# Patient Record
Sex: MALE | Race: WHITE
[De-identification: names, ages, dates, MRNs, and addresses within clinical notes are randomized per-mention and may not be internally consistent; named-entity substitution may affect disease eponyms.]

---

## 2017-04-03 ENCOUNTER — HOSPITAL ENCOUNTER (OUTPATIENT)
Dept: HOSPITAL 53 - M LAB | Age: 57
End: 2017-04-03
Attending: FAMILY MEDICINE
Payer: COMMERCIAL

## 2017-04-03 DIAGNOSIS — E13.9: Primary | ICD-10-CM

## 2017-04-03 LAB
ALBUMIN SERPL BCG-MCNC: 3.7 GM/DL (ref 3.2–5.2)
ALBUMIN/GLOB SERPL: 1.09 {RATIO} (ref 1–1.93)
ALP SERPL-CCNC: 90 U/L (ref 45–117)
ALT SERPL W P-5'-P-CCNC: 82 U/L (ref 12–78)
ANION GAP SERPL CALC-SCNC: 9 MEQ/L (ref 8–16)
AST SERPL-CCNC: 68 U/L (ref 15–37)
BILIRUB SERPL-MCNC: 0.9 MG/DL (ref 0.2–1)
BUN SERPL-MCNC: 24 MG/DL (ref 7–18)
CALCIUM SERPL-MCNC: 9.2 MG/DL (ref 8.5–10.1)
CHLORIDE SERPL-SCNC: 102 MEQ/L (ref 98–107)
CO2 SERPL-SCNC: 27 MEQ/L (ref 21–32)
CREAT SERPL-MCNC: 1.13 MG/DL (ref 0.7–1.3)
EST. AVERAGE GLUCOSE BLD GHB EST-MCNC: 169 MG/DL (ref 60–110)
GFR SERPL CREATININE-BSD FRML MDRD: > 60 ML/MIN/{1.73_M2} (ref 56–?)
GLUCOSE SERPL-MCNC: 230 MG/DL (ref 70–105)
POTASSIUM SERPL-SCNC: 4.3 MEQ/L (ref 3.5–5.1)
PROT SERPL-MCNC: 7.1 GM/DL (ref 6.4–8.2)
SODIUM SERPL-SCNC: 138 MEQ/L (ref 136–145)

## 2017-10-10 ENCOUNTER — HOSPITAL ENCOUNTER (OUTPATIENT)
Dept: HOSPITAL 53 - M LAB | Age: 57
End: 2017-10-10
Attending: FAMILY MEDICINE
Payer: COMMERCIAL

## 2017-10-10 DIAGNOSIS — N18.2: Primary | ICD-10-CM

## 2017-10-10 DIAGNOSIS — E78.2: ICD-10-CM

## 2017-10-10 DIAGNOSIS — Z12.5: ICD-10-CM

## 2017-10-10 DIAGNOSIS — E13.9: ICD-10-CM

## 2017-10-10 DIAGNOSIS — Z11.59: ICD-10-CM

## 2017-10-10 LAB
ALBUMIN SERPL BCG-MCNC: 3.5 GM/DL (ref 3.2–5.2)
ALBUMIN/GLOB SERPL: 0.92 {RATIO} (ref 1–1.93)
ALP SERPL-CCNC: 83 U/L (ref 45–117)
ALT SERPL W P-5'-P-CCNC: 54 U/L (ref 12–78)
ANION GAP SERPL CALC-SCNC: 9 MEQ/L (ref 8–16)
AST SERPL-CCNC: 67 U/L (ref 15–37)
BILIRUB SERPL-MCNC: 1.1 MG/DL (ref 0.2–1)
BUN SERPL-MCNC: 22 MG/DL (ref 7–18)
CALCIUM SERPL-MCNC: 9.2 MG/DL (ref 8.5–10.1)
CHLORIDE SERPL-SCNC: 104 MEQ/L (ref 98–107)
CHOLEST SERPL-MCNC: 180 MG/DL (ref ?–200)
CO2 SERPL-SCNC: 26 MEQ/L (ref 21–32)
CREAT SERPL-MCNC: 1.11 MG/DL (ref 0.7–1.3)
ERYTHROCYTE [DISTWIDTH] IN BLOOD BY AUTOMATED COUNT: 13.2 % (ref 11.5–14.5)
EST. AVERAGE GLUCOSE BLD GHB EST-MCNC: 194 MG/DL (ref 60–110)
GFR SERPL CREATININE-BSD FRML MDRD: > 60 ML/MIN/{1.73_M2} (ref 56–?)
GLUCOSE SERPL-MCNC: 182 MG/DL (ref 70–105)
MCH RBC QN AUTO: 30 PG (ref 27–33)
MCHC RBC AUTO-ENTMCNC: 33.5 G/DL (ref 32–36.5)
MCV RBC AUTO: 89.7 FL (ref 80–96)
PLATELET # BLD AUTO: 121 10^3/UL (ref 150–450)
POTASSIUM SERPL-SCNC: 4.1 MEQ/L (ref 3.5–5.1)
PROT SERPL-MCNC: 7.3 GM/DL (ref 6.4–8.2)
SODIUM SERPL-SCNC: 139 MEQ/L (ref 136–145)
TRIGL SERPL-MCNC: 293 MG/DL (ref ?–150)
WBC # BLD AUTO: 6 10^3/UL (ref 4–10)

## 2017-10-10 PROCEDURE — 80061 LIPID PANEL: CPT

## 2017-10-10 PROCEDURE — 86803 HEPATITIS C AB TEST: CPT

## 2017-10-10 PROCEDURE — 82043 UR ALBUMIN QUANTITATIVE: CPT

## 2017-10-10 PROCEDURE — 36415 COLL VENOUS BLD VENIPUNCTURE: CPT

## 2017-10-10 PROCEDURE — 80053 COMPREHEN METABOLIC PANEL: CPT

## 2017-10-10 PROCEDURE — 85027 COMPLETE CBC AUTOMATED: CPT

## 2017-10-10 PROCEDURE — 83036 HEMOGLOBIN GLYCOSYLATED A1C: CPT

## 2018-01-15 ENCOUNTER — HOSPITAL ENCOUNTER (OUTPATIENT)
Dept: HOSPITAL 53 - M LAB | Age: 58
End: 2018-01-15
Attending: FAMILY MEDICINE
Payer: COMMERCIAL

## 2018-01-15 DIAGNOSIS — E13.9: Primary | ICD-10-CM

## 2018-01-15 LAB
ANION GAP: 8 MEQ/L (ref 8–16)
BLOOD UREA NITROGEN: 21 MG/DL (ref 7–18)
CALCIUM LEVEL: 9.2 MG/DL (ref 8.5–10.1)
CARBON DIOXIDE LEVEL: 29 MEQ/L (ref 21–32)
CHLORIDE LEVEL: 103 MEQ/L (ref 98–107)
CREATININE FOR GFR: 1.3 MG/DL (ref 0.7–1.3)
EST. AVERAGE GLUCOSE BLD GHB EST-MCNC: 180 MG/DL (ref 60–110)
GFR SERPL CREATININE-BSD FRML MDRD: > 60 ML/MIN/{1.73_M2} (ref 56–?)
GLUCOSE, FASTING: 189 MG/DL (ref 70–105)
POTASSIUM SERUM: 3.7 MEQ/L (ref 3.5–5.1)
SODIUM LEVEL: 140 MEQ/L (ref 136–145)

## 2018-01-15 PROCEDURE — 83036 HEMOGLOBIN GLYCOSYLATED A1C: CPT

## 2018-05-16 ENCOUNTER — HOSPITAL ENCOUNTER (OUTPATIENT)
Dept: HOSPITAL 53 - M LAB | Age: 58
End: 2018-05-16
Attending: FAMILY MEDICINE
Payer: COMMERCIAL

## 2018-05-16 DIAGNOSIS — E13.9: Primary | ICD-10-CM

## 2018-05-16 LAB
ALBUMIN/GLOBULIN RATIO: 0.88 (ref 1–1.93)
ALBUMIN: 3.8 GM/DL (ref 3.2–5.2)
ALKALINE PHOSPHATASE: 123 U/L (ref 45–117)
ALT SERPL W P-5'-P-CCNC: 61 U/L (ref 12–78)
ANION GAP: 9 MEQ/L (ref 8–16)
AST SERPL-CCNC: 63 U/L (ref 7–37)
BILIRUBIN,TOTAL: 1.1 MG/DL (ref 0.2–1)
BLOOD UREA NITROGEN: 25 MG/DL (ref 7–18)
CALCIUM LEVEL: 9.4 MG/DL (ref 8.5–10.1)
CARBON DIOXIDE LEVEL: 26 MEQ/L (ref 21–32)
CHLORIDE LEVEL: 105 MEQ/L (ref 98–107)
CREATININE FOR GFR: 1.27 MG/DL (ref 0.7–1.3)
EST. AVERAGE GLUCOSE BLD GHB EST-MCNC: 194 MG/DL (ref 60–110)
GFR SERPL CREATININE-BSD FRML MDRD: > 60 ML/MIN/{1.73_M2} (ref 56–?)
GLUCOSE, FASTING: 192 MG/DL (ref 70–100)
POTASSIUM SERUM: 4 MEQ/L (ref 3.5–5.1)
SODIUM LEVEL: 140 MEQ/L (ref 136–145)
TOTAL PROTEIN: 8.1 GM/DL (ref 6.4–8.2)

## 2018-05-16 PROCEDURE — 80053 COMPREHEN METABOLIC PANEL: CPT

## 2018-11-19 ENCOUNTER — HOSPITAL ENCOUNTER (OUTPATIENT)
Dept: HOSPITAL 53 - M LAB | Age: 58
End: 2018-11-19
Attending: FAMILY MEDICINE
Payer: COMMERCIAL

## 2018-11-19 DIAGNOSIS — I25.10: ICD-10-CM

## 2018-11-19 DIAGNOSIS — E78.2: ICD-10-CM

## 2018-11-19 DIAGNOSIS — Z12.5: Primary | ICD-10-CM

## 2018-11-19 DIAGNOSIS — E13.9: ICD-10-CM

## 2018-11-19 LAB
ALBUMIN/GLOBULIN RATIO: 0.88 (ref 1–1.93)
ALBUMIN: 3.6 GM/DL (ref 3.2–5.2)
ALKALINE PHOSPHATASE: 132 U/L (ref 45–117)
ALT SERPL W P-5'-P-CCNC: 55 U/L (ref 12–78)
ANION GAP: 7 MEQ/L (ref 8–16)
AST SERPL-CCNC: 62 U/L (ref 7–37)
BILIRUBIN,TOTAL: 1.1 MG/DL (ref 0.2–1)
BLOOD UREA NITROGEN: 16 MG/DL (ref 7–18)
CALCIUM LEVEL: 9.4 MG/DL (ref 8.5–10.1)
CARBON DIOXIDE LEVEL: 30 MEQ/L (ref 21–32)
CHLORIDE LEVEL: 104 MEQ/L (ref 98–107)
CHOLEST SERPL-MCNC: 205 MG/DL (ref ?–200)
CHOLESTEROL RISK RATIO: 7.32 (ref ?–5)
CREAT UR-MCNC: 83.9 MG/DL
CREATININE FOR GFR: 1.23 MG/DL (ref 0.7–1.3)
EST. AVERAGE GLUCOSE BLD GHB EST-MCNC: 186 MG/DL (ref 60–110)
GFR SERPL CREATININE-BSD FRML MDRD: > 60 ML/MIN/{1.73_M2} (ref 56–?)
GLUCOSE, FASTING: 194 MG/DL (ref 70–100)
HDLC SERPL-MCNC: 28 MG/DL (ref 40–?)
HEMATOCRIT: 47.3 % (ref 42–52)
HEMOGLOBIN: 15.3 G/DL (ref 13.5–17.5)
MEAN CORPUSCULAR HEMOGLOBIN: 28.4 PG (ref 27–33)
MEAN CORPUSCULAR HGB CONC: 32.3 G/DL (ref 32–36.5)
MEAN CORPUSCULAR VOLUME: 87.8 FL (ref 80–96)
MICROALBUMIN UR-MCNC: < 5 MG/L
MICROALBUMIN/CREAT UR: 5.9 MCG/MG (ref 0–30)
NONHDLC SERPL-MCNC: 177 MG/DL
NRBC BLD AUTO-RTO: 0 % (ref 0–0)
PLATELET COUNT, AUTOMATED: 121 10^3/UL (ref 150–450)
POTASSIUM SERUM: 4 MEQ/L (ref 3.5–5.1)
PSA SERPL-MCNC: 0.2 NG/ML (ref ?–4)
RED BLOOD COUNT: 5.39 10^6/UL (ref 4.3–6.1)
RED CELL DISTRIBUTION WIDTH: 14.5 % (ref 11.5–14.5)
SODIUM LEVEL: 141 MEQ/L (ref 136–145)
TOTAL PROTEIN: 7.7 GM/DL (ref 6.4–8.2)
TRIGLYCERIDES LEVEL: 408 MG/DL (ref ?–150)
WHITE BLOOD COUNT: 6.4 10^3/UL (ref 4–10)

## 2018-11-19 PROCEDURE — 80053 COMPREHEN METABOLIC PANEL: CPT

## 2019-06-04 ENCOUNTER — HOSPITAL ENCOUNTER (OUTPATIENT)
Dept: HOSPITAL 53 - M LAB | Age: 59
End: 2019-06-04
Attending: FAMILY MEDICINE
Payer: COMMERCIAL

## 2019-06-04 DIAGNOSIS — E78.2: ICD-10-CM

## 2019-06-04 DIAGNOSIS — E13.9: Primary | ICD-10-CM

## 2019-06-04 LAB
ALBUMIN SERPL BCG-MCNC: 3.6 GM/DL (ref 3.2–5.2)
ALT SERPL W P-5'-P-CCNC: 54 U/L (ref 12–78)
BILIRUB SERPL-MCNC: 2.5 MG/DL (ref 0.2–1)
BUN SERPL-MCNC: 27 MG/DL (ref 7–18)
CALCIUM SERPL-MCNC: 9.1 MG/DL (ref 8.5–10.1)
CHLORIDE SERPL-SCNC: 103 MEQ/L (ref 98–107)
CHOLEST SERPL-MCNC: 187 MG/DL (ref ?–200)
CHOLEST/HDLC SERPL: 5.67 {RATIO} (ref ?–5)
CO2 SERPL-SCNC: 24 MEQ/L (ref 21–32)
CREAT SERPL-MCNC: 1.29 MG/DL (ref 0.7–1.3)
EST. AVERAGE GLUCOSE BLD GHB EST-MCNC: 186 MG/DL (ref 60–110)
GFR SERPL CREATININE-BSD FRML MDRD: > 60 ML/MIN/{1.73_M2} (ref 56–?)
GLUCOSE SERPL-MCNC: 153 MG/DL (ref 70–100)
HDLC SERPL-MCNC: 33 MG/DL (ref 40–?)
LDLC SERPL CALC-MCNC: 103 MG/DL (ref ?–100)
NONHDLC SERPL-MCNC: 154 MG/DL
POTASSIUM SERPL-SCNC: 4.1 MEQ/L (ref 3.5–5.1)
PROT SERPL-MCNC: 8.1 GM/DL (ref 6.4–8.2)
SODIUM SERPL-SCNC: 137 MEQ/L (ref 136–145)
TRIGL SERPL-MCNC: 255 MG/DL (ref ?–150)

## 2019-06-06 ENCOUNTER — HOSPITAL ENCOUNTER (OUTPATIENT)
Dept: HOSPITAL 53 - M SFHCADAM | Age: 59
End: 2019-06-06
Attending: PHYSICIAN ASSISTANT
Payer: COMMERCIAL

## 2019-06-06 DIAGNOSIS — R19.7: Primary | ICD-10-CM

## 2019-06-06 LAB
ALBUMIN SERPL BCG-MCNC: 3.8 GM/DL (ref 3.2–5.2)
ALT SERPL W P-5'-P-CCNC: 65 U/L (ref 12–78)
AMYLASE SERPL-CCNC: 95 U/L (ref 25–115)
BASOPHILS # BLD AUTO: 0 10^3/UL (ref 0–0.2)
BASOPHILS NFR BLD AUTO: 0.4 % (ref 0–1)
BILIRUB SERPL-MCNC: 1.6 MG/DL (ref 0.2–1)
BUN SERPL-MCNC: 25 MG/DL (ref 7–18)
CALCIUM SERPL-MCNC: 9.3 MG/DL (ref 8.5–10.1)
CHLORIDE SERPL-SCNC: 106 MEQ/L (ref 98–107)
CO2 SERPL-SCNC: 25 MEQ/L (ref 21–32)
CREAT SERPL-MCNC: 1.28 MG/DL (ref 0.7–1.3)
EOSINOPHIL # BLD AUTO: 0.2 10^3/UL (ref 0–0.5)
EOSINOPHIL NFR BLD AUTO: 2.4 % (ref 0–3)
GFR SERPL CREATININE-BSD FRML MDRD: > 60 ML/MIN/{1.73_M2} (ref 56–?)
GLUCOSE SERPL-MCNC: 118 MG/DL (ref 70–100)
HCT VFR BLD AUTO: 46.8 % (ref 42–52)
HGB BLD-MCNC: 15.2 G/DL (ref 13.5–17.5)
LIPASE SERPL-CCNC: 562 U/L (ref 73–393)
LYMPHOCYTES # BLD AUTO: 3 10^3/UL (ref 1.5–4.5)
LYMPHOCYTES NFR BLD AUTO: 30.8 % (ref 24–44)
MCH RBC QN AUTO: 27.9 PG (ref 27–33)
MCHC RBC AUTO-ENTMCNC: 32.5 G/DL (ref 32–36.5)
MCV RBC AUTO: 86 FL (ref 80–96)
MONOCYTES # BLD AUTO: 1 10^3/UL (ref 0–0.8)
MONOCYTES NFR BLD AUTO: 10.8 % (ref 0–5)
NEUTROPHILS # BLD AUTO: 5.3 10^3/UL (ref 1.8–7.7)
NEUTROPHILS NFR BLD AUTO: 55.2 % (ref 36–66)
PLATELET # BLD AUTO: 172 10^3/UL (ref 150–450)
POTASSIUM SERPL-SCNC: 3.9 MEQ/L (ref 3.5–5.1)
PROT SERPL-MCNC: 8.1 GM/DL (ref 6.4–8.2)
RBC # BLD AUTO: 5.44 10^6/UL (ref 4.3–6.1)
SODIUM SERPL-SCNC: 140 MEQ/L (ref 136–145)
WBC # BLD AUTO: 9.7 10^3/UL (ref 4–10)

## 2019-06-07 ENCOUNTER — HOSPITAL ENCOUNTER (OUTPATIENT)
Dept: HOSPITAL 53 - M RAD | Age: 59
End: 2019-06-07
Attending: PHYSICIAN ASSISTANT
Payer: COMMERCIAL

## 2019-06-07 DIAGNOSIS — R74.8: Primary | ICD-10-CM

## 2019-06-07 DIAGNOSIS — R19.7: Primary | ICD-10-CM

## 2019-06-07 PROCEDURE — 74177 CT ABD & PELVIS W/CONTRAST: CPT

## 2019-06-07 NOTE — REP
RIGHT UPPER QUADRANT ULTRASOUND:

 

Real-time sonographic evaluation of the right upper quadrant performed.  Patient

has had a prior cholecystectomy.  There is no intrahepatic or extrahepatic

biliary dilatation, common bile duct measuring 6 mm.  Liver is heterogeneous in

echotexture and mildly enlarged at 17.4 cm in length.  No liver mass is seen.

Main portal vein is mildly dilated measuring 16 mm which indicate portal

hypertension.  Pancreas could not be visualized.  Right kidney demonstrates no

hydronephrosis with normal size 11.4 cm in length.  No free fluid is seen.

 

IMPRESSION:

 

Status post cholecystectomy.  No biliary dilatation.  No free fluid.  Mild

hepatomegaly with heterogeneous echotexture.  Mildly dilated main portal vein may

indicate portal hypertension.

 

 

Electronically Signed by

Ronald Adan MD 06/10/2019 05:01 P

## 2019-06-07 NOTE — REP
CT ABDOMEN AND PELVIS WITH ORAL AND IV CONTRAST:

 

TECHNIQUE:  Axial contrast enhanced images from the lung bases to the pubic

symphysis using 100 mL Isovue 370 intravenous contrast material with multiplanar

reformations.

 

No infiltrate is seen in the visualized lung bases. No liver mass is seen. The

patient has had a prior cholecystectomy. There is no biliary dilatation. Spleen

is mildly enlarged with a length of 14 cm. No intrinsic splenic abnormality is

seen. Adrenals and pancreas are unremarkable. There is no CT evidence of

pancreatitis. Kidneys demonstrate no hydronephrosis or mass. There is

atherosclerotic calcification of the abdominal aorta without aneurysm. There is

adenopathy. There is no free air or free fluid. There is no bowel wall

thickening. The appendix is normal. No pelvic mass is seen. The urinary bladder

appears unremarkable.

 

There are degenerative changes of the spine.

 

There is a total right hip prosthesis noted.

 

IMPRESSION:

 

No acute abnormality is detected. Status-post cholecystectomy without biliary

dilatation. No free air or free fluid. Mild splenomegaly. Normal appendix. No CT

evidence of pancreatitis.

 

 

Electronically Signed by

Ronald Adan MD 06/11/2019 09:47 A

## 2019-06-09 ENCOUNTER — HOSPITAL ENCOUNTER (OUTPATIENT)
Dept: HOSPITAL 53 - M LAB | Age: 59
End: 2019-06-09
Attending: PHYSICIAN ASSISTANT
Payer: COMMERCIAL

## 2019-06-09 DIAGNOSIS — R19.7: Primary | ICD-10-CM

## 2019-09-03 ENCOUNTER — HOSPITAL ENCOUNTER (OUTPATIENT)
Dept: HOSPITAL 53 - M LAB | Age: 59
End: 2019-09-03
Attending: PHYSICIAN ASSISTANT
Payer: COMMERCIAL

## 2019-09-03 DIAGNOSIS — E11.9: Primary | ICD-10-CM

## 2019-09-03 DIAGNOSIS — A07.1: ICD-10-CM

## 2019-09-03 DIAGNOSIS — K76.9: ICD-10-CM

## 2019-09-03 LAB
ALBUMIN SERPL BCG-MCNC: 3.6 GM/DL (ref 3.2–5.2)
ALT SERPL W P-5'-P-CCNC: 50 U/L (ref 12–78)
BILIRUB SERPL-MCNC: 1.5 MG/DL (ref 0.2–1)
BUN SERPL-MCNC: 22 MG/DL (ref 7–18)
CALCIUM SERPL-MCNC: 9 MG/DL (ref 8.5–10.1)
CHLORIDE SERPL-SCNC: 106 MEQ/L (ref 98–107)
CO2 SERPL-SCNC: 27 MEQ/L (ref 21–32)
CREAT SERPL-MCNC: 1.14 MG/DL (ref 0.7–1.3)
EST. AVERAGE GLUCOSE BLD GHB EST-MCNC: 146 MG/DL (ref 60–110)
GFR SERPL CREATININE-BSD FRML MDRD: > 60 ML/MIN/{1.73_M2} (ref 56–?)
GLUCOSE SERPL-MCNC: 94 MG/DL (ref 70–100)
HCT VFR BLD AUTO: 42.8 % (ref 42–52)
HGB BLD-MCNC: 14.4 G/DL (ref 13.5–17.5)
MCH RBC QN AUTO: 29.4 PG (ref 27–33)
MCHC RBC AUTO-ENTMCNC: 33.6 G/DL (ref 32–36.5)
MCV RBC AUTO: 87.5 FL (ref 80–96)
PLATELET # BLD AUTO: 113 10^3/UL (ref 150–450)
POTASSIUM SERPL-SCNC: 3.8 MEQ/L (ref 3.5–5.1)
PROT SERPL-MCNC: 7.6 GM/DL (ref 6.4–8.2)
RBC # BLD AUTO: 4.89 10^6/UL (ref 4.3–6.1)
SODIUM SERPL-SCNC: 139 MEQ/L (ref 136–145)
WBC # BLD AUTO: 5.7 10^3/UL (ref 4–10)

## 2020-03-05 ENCOUNTER — HOSPITAL ENCOUNTER (OUTPATIENT)
Dept: HOSPITAL 53 - M LAB | Age: 60
End: 2020-03-05
Attending: FAMILY MEDICINE
Payer: COMMERCIAL

## 2020-03-05 DIAGNOSIS — I25.10: Primary | ICD-10-CM

## 2020-03-05 DIAGNOSIS — K76.9: ICD-10-CM

## 2020-03-05 DIAGNOSIS — E78.2: ICD-10-CM

## 2020-03-05 DIAGNOSIS — Z12.5: ICD-10-CM

## 2020-03-05 DIAGNOSIS — E13.9: ICD-10-CM

## 2020-03-05 DIAGNOSIS — I11.9: ICD-10-CM

## 2020-03-05 LAB
ALBUMIN SERPL BCG-MCNC: 3.6 GM/DL (ref 3.2–5.2)
ALT SERPL W P-5'-P-CCNC: 54 U/L (ref 12–78)
BILIRUB SERPL-MCNC: 1.2 MG/DL (ref 0.2–1)
BUN SERPL-MCNC: 19 MG/DL (ref 7–18)
CALCIUM SERPL-MCNC: 9.3 MG/DL (ref 8.8–10.2)
CHLORIDE SERPL-SCNC: 108 MEQ/L (ref 98–107)
CHOLEST SERPL-MCNC: 189 MG/DL (ref ?–200)
CHOLEST/HDLC SERPL: 6.75 {RATIO} (ref ?–5)
CO2 SERPL-SCNC: 25 MEQ/L (ref 21–32)
CREAT SERPL-MCNC: 1.14 MG/DL (ref 0.7–1.3)
CREAT UR-MCNC: 75.6 MG/DL
EST. AVERAGE GLUCOSE BLD GHB EST-MCNC: 148 MG/DL (ref 60–110)
GFR SERPL CREATININE-BSD FRML MDRD: > 60 ML/MIN/{1.73_M2} (ref 49–?)
GLUCOSE SERPL-MCNC: 122 MG/DL (ref 70–100)
HCT VFR BLD AUTO: 44.5 % (ref 42–52)
HDLC SERPL-MCNC: 28 MG/DL (ref 40–?)
HGB BLD-MCNC: 14.8 G/DL (ref 13.5–17.5)
LDLC SERPL CALC-MCNC: 83 MG/DL (ref ?–100)
MCH RBC QN AUTO: 29.3 PG (ref 27–33)
MCHC RBC AUTO-ENTMCNC: 33.3 G/DL (ref 32–36.5)
MCV RBC AUTO: 88.1 FL (ref 80–96)
MICROALBUMIN UR-MCNC: < 5 MG/L
MICROALBUMIN/CREAT UR: 6.6 MCG/MG (ref 0–30)
NONHDLC SERPL-MCNC: 161 MG/DL
PLATELET # BLD AUTO: 99 10^3/UL (ref 150–450)
POTASSIUM SERPL-SCNC: 4 MEQ/L (ref 3.5–5.1)
PROT SERPL-MCNC: 7.6 GM/DL (ref 6.4–8.2)
RBC # BLD AUTO: 5.05 10^6/UL (ref 4.3–6.1)
SODIUM SERPL-SCNC: 141 MEQ/L (ref 136–145)
TRIGL SERPL-MCNC: 391 MG/DL (ref ?–150)
WBC # BLD AUTO: 5.5 10^3/UL (ref 4–10)

## 2020-03-05 PROCEDURE — 83036 HEMOGLOBIN GLYCOSYLATED A1C: CPT

## 2020-03-05 PROCEDURE — 36415 COLL VENOUS BLD VENIPUNCTURE: CPT

## 2020-03-05 PROCEDURE — 80061 LIPID PANEL: CPT

## 2020-03-05 PROCEDURE — 85049 AUTOMATED PLATELET COUNT: CPT

## 2020-03-05 PROCEDURE — 85027 COMPLETE CBC AUTOMATED: CPT

## 2020-03-05 PROCEDURE — 82043 UR ALBUMIN QUANTITATIVE: CPT

## 2020-03-05 PROCEDURE — 85055 RETICULATED PLATELET ASSAY: CPT

## 2020-03-05 PROCEDURE — 80053 COMPREHEN METABOLIC PANEL: CPT

## 2020-10-13 ENCOUNTER — HOSPITAL ENCOUNTER (OUTPATIENT)
Dept: HOSPITAL 53 - M SFHCADAM | Age: 60
End: 2020-10-13
Attending: FAMILY MEDICINE
Payer: COMMERCIAL

## 2020-10-13 DIAGNOSIS — E13.9: Primary | ICD-10-CM

## 2020-10-13 LAB
BUN SERPL-MCNC: 20 MG/DL (ref 7–18)
CALCIUM SERPL-MCNC: 9.3 MG/DL (ref 8.8–10.2)
CHLORIDE SERPL-SCNC: 106 MEQ/L (ref 98–107)
CO2 SERPL-SCNC: 28 MEQ/L (ref 21–32)
CREAT SERPL-MCNC: 1.48 MG/DL (ref 0.7–1.3)
EST. AVERAGE GLUCOSE BLD GHB EST-MCNC: 151 MG/DL (ref 60–110)
GFR SERPL CREATININE-BSD FRML MDRD: 51.6 ML/MIN/{1.73_M2} (ref 49–?)
GLUCOSE SERPL-MCNC: 134 MG/DL (ref 70–100)
POTASSIUM SERPL-SCNC: 4 MEQ/L (ref 3.5–5.1)
SODIUM SERPL-SCNC: 141 MEQ/L (ref 136–145)

## 2020-12-15 ENCOUNTER — HOSPITAL ENCOUNTER (OUTPATIENT)
Dept: HOSPITAL 53 - M SFHCPLAZ | Age: 60
End: 2020-12-15
Attending: PHYSICIAN ASSISTANT
Payer: COMMERCIAL

## 2020-12-15 DIAGNOSIS — R10.33: Primary | ICD-10-CM

## 2020-12-15 LAB
ALBUMIN SERPL BCG-MCNC: 3.6 GM/DL (ref 3.2–5.2)
ALT SERPL W P-5'-P-CCNC: 66 U/L (ref 12–78)
BASOPHILS # BLD AUTO: 0.1 10^3/UL (ref 0–0.2)
BASOPHILS NFR BLD AUTO: 0.8 % (ref 0–1)
BILIRUB SERPL-MCNC: 2.3 MG/DL (ref 0.2–1)
BUN SERPL-MCNC: 26 MG/DL (ref 7–18)
CALCIUM SERPL-MCNC: 10.2 MG/DL (ref 8.8–10.2)
CHLORIDE SERPL-SCNC: 103 MEQ/L (ref 98–107)
CO2 SERPL-SCNC: 28 MEQ/L (ref 21–32)
CREAT SERPL-MCNC: 1.41 MG/DL (ref 0.7–1.3)
CRP SERPL-MCNC: 0.53 MG/DL (ref 0–0.3)
EOSINOPHIL # BLD AUTO: 0.2 10^3/UL (ref 0–0.5)
EOSINOPHIL NFR BLD AUTO: 3.1 % (ref 0–3)
ERYTHROCYTE [SEDIMENTATION RATE] IN BLOOD BY WESTERGREN METHOD: 32 MM/HR (ref 0–20)
GFR SERPL CREATININE-BSD FRML MDRD: 54.6 ML/MIN/{1.73_M2} (ref 49–?)
GLUCOSE SERPL-MCNC: 130 MG/DL (ref 70–100)
HCT VFR BLD AUTO: 45.8 % (ref 42–52)
HGB BLD-MCNC: 15.6 G/DL (ref 13.5–17.5)
LIPASE SERPL-CCNC: 922 U/L (ref 73–393)
LYMPHOCYTES # BLD AUTO: 2.4 10^3/UL (ref 1.5–5)
LYMPHOCYTES NFR BLD AUTO: 32.7 % (ref 24–44)
MCH RBC QN AUTO: 30.7 PG (ref 27–33)
MCHC RBC AUTO-ENTMCNC: 34.1 G/DL (ref 32–36.5)
MCV RBC AUTO: 90.2 FL (ref 80–96)
MONOCYTES # BLD AUTO: 0.6 10^3/UL (ref 0–0.8)
MONOCYTES NFR BLD AUTO: 8.4 % (ref 0–5)
NEUTROPHILS # BLD AUTO: 4.1 10^3/UL (ref 1.5–8.5)
NEUTROPHILS NFR BLD AUTO: 54.7 % (ref 36–66)
PLATELET # BLD AUTO: 119 10^3/UL (ref 150–450)
POTASSIUM SERPL-SCNC: 3.8 MEQ/L (ref 3.5–5.1)
PROT SERPL-MCNC: 8.5 GM/DL (ref 6.4–8.2)
RBC # BLD AUTO: 5.08 10^6/UL (ref 4.3–6.1)
SODIUM SERPL-SCNC: 138 MEQ/L (ref 136–145)
WBC # BLD AUTO: 7.5 10^3/UL (ref 4–10)

## 2020-12-16 ENCOUNTER — HOSPITAL ENCOUNTER (OUTPATIENT)
Dept: HOSPITAL 53 - M RAD | Age: 60
End: 2020-12-16
Attending: PHYSICIAN ASSISTANT
Payer: COMMERCIAL

## 2020-12-16 DIAGNOSIS — Z90.49: ICD-10-CM

## 2020-12-16 DIAGNOSIS — R10.33: ICD-10-CM

## 2020-12-16 DIAGNOSIS — K57.30: Primary | ICD-10-CM

## 2020-12-16 PROCEDURE — 74177 CT ABD & PELVIS W/CONTRAST: CPT

## 2020-12-16 NOTE — REP
INDICATION:

PERIUMBILICAL ABD PAIN.



COMPARISON:

Comparison CT study June 7, 2019..



TECHNIQUE:

100 mL of intravenous Isovue 370 is administered.  Helical scanning is acquired.  3 mm

axial images are re-formatted.  Coronal and sagittal MPR images are generated.  Oral

contrast was also administered.



FINDINGS:

Digital preliminary  radiograph shows a right hip arthroplasty and surgical clips

in the right upper quadrant of the abdomen.  Bowel gas pattern is normal.  On axial CT

images, the lung bases are clear.  There is heavy vascular calcification and/or stent

material along the course of the coronary arteries bilaterally.  No pleural effusion

or upper abdominal ascites is seen.



There is mild fat streaking in the region of the descending duodenum and pancreatic

head which appears to be a new finding.  Question pancreatitis.



There are venous collaterals around the gastroesophageal junction and distal

esophagus.  The spleen is mildly enlarged measuring 13.9 cm in greatest diameter.  No

focal splenic lesion is seen.  No abnormality is noted in the pancreas.  The

gallbladder is surgically absent.  The liver is not felt to be enlarged.  Normal

adrenal glands are seen.  There are some venous collaterals a in the celiac axis

region.  Portal vein is not felt to be dilated.  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

malrotated but otherwise intact.  Kidneys enhance symmetrically.  No retroperitoneal

mass or adenopathy is seen.  The patient reports prior appendectomy however a normal

appendix is visible at the cecal tip in the right lower quadrant.  There is mild cecal

diverticulosis without CT evidence of diverticulitis.  There is also mild

diverticulosis of the sigmoid colon again without evidence of diverticulitis.  Urinary

bladder is unremarkable.  There is some spray artifact at on pelvic images from the

right hip arthroplasty.  Vascular calcification is noted.  There is a stable

oval-shaped fat lobule in the suprapubic region anterior abdomen 2.9 cm in diameter

unchanged.  This does not appear inflamed and has not enlarged.  No bony destructive

lesion is seen.  No evidence of free air or free fluid.



IMPRESSION:

Post cholecystectomy and right hip arthroplasty.  Mild right and left colonic

diverticulosis without CT evidence of diverticulitis.  There is some fat streaking

adjacent to the pancreatic head and the descending duodenum.  Question pancreatitis.

Mildly enlarged spleen.  Venous collaterals in the upper abdomen question mild portal

venous hypertension.





<Electronically signed by Omar Garner > 12/16/20 [id number]

## 2020-12-18 ENCOUNTER — HOSPITAL ENCOUNTER (OUTPATIENT)
Dept: HOSPITAL 53 - M SFHCPLAZ | Age: 60
End: 2020-12-18
Attending: PHYSICIAN ASSISTANT
Payer: COMMERCIAL

## 2020-12-18 DIAGNOSIS — K76.0: Primary | ICD-10-CM

## 2020-12-18 LAB
ALBUMIN SERPL BCG-MCNC: 3.6 GM/DL (ref 3.2–5.2)
ALT SERPL W P-5'-P-CCNC: 86 U/L (ref 12–78)
BASOPHILS # BLD AUTO: 0.1 10^3/UL (ref 0–0.2)
BASOPHILS NFR BLD AUTO: 1 % (ref 0–1)
BILIRUB CONJ SERPL-MCNC: 0.6 MG/DL (ref 0–0.2)
BILIRUB SERPL-MCNC: 2 MG/DL (ref 0.2–1)
BUN SERPL-MCNC: 21 MG/DL (ref 7–18)
CALCIUM SERPL-MCNC: 9.9 MG/DL (ref 8.8–10.2)
CHLORIDE SERPL-SCNC: 103 MEQ/L (ref 98–107)
CO2 SERPL-SCNC: 30 MEQ/L (ref 21–32)
CREAT SERPL-MCNC: 1.42 MG/DL (ref 0.7–1.3)
EOSINOPHIL # BLD AUTO: 0.3 10^3/UL (ref 0–0.5)
EOSINOPHIL NFR BLD AUTO: 3.4 % (ref 0–3)
FERRITIN SERPL-MCNC: 83 NG/ML (ref 26–388)
GFR SERPL CREATININE-BSD FRML MDRD: 54.1 ML/MIN/{1.73_M2} (ref 49–?)
GLUCOSE SERPL-MCNC: 141 MG/DL (ref 70–100)
HCT VFR BLD AUTO: 46.3 % (ref 42–52)
HGB BLD-MCNC: 15.8 G/DL (ref 13.5–17.5)
IRON SATN MFR SERPL: 22.8 % (ref 19.7–50)
IRON SERPL-MCNC: 87 UG/DL (ref 65–175)
LIPASE SERPL-CCNC: 473 U/L (ref 73–393)
LYMPHOCYTES # BLD AUTO: 2.6 10^3/UL (ref 1.5–5)
LYMPHOCYTES NFR BLD AUTO: 35.4 % (ref 24–44)
MCH RBC QN AUTO: 30.7 PG (ref 27–33)
MCHC RBC AUTO-ENTMCNC: 34.1 G/DL (ref 32–36.5)
MCV RBC AUTO: 89.9 FL (ref 80–96)
MONOCYTES # BLD AUTO: 0.8 10^3/UL (ref 0–0.8)
MONOCYTES NFR BLD AUTO: 10.2 % (ref 0–5)
NEUTROPHILS # BLD AUTO: 3.6 10^3/UL (ref 1.5–8.5)
NEUTROPHILS NFR BLD AUTO: 49.7 % (ref 36–66)
PHOSPHATE SERPL-MCNC: 3.7 MG/DL (ref 2.5–4.9)
PLATELET # BLD AUTO: 116 10^3/UL (ref 150–450)
POTASSIUM SERPL-SCNC: 3.7 MEQ/L (ref 3.5–5.1)
PROT SERPL-MCNC: 8.4 GM/DL (ref 6.4–8.2)
RBC # BLD AUTO: 5.15 10^6/UL (ref 4.3–6.1)
SODIUM SERPL-SCNC: 139 MEQ/L (ref 136–145)
TIBC SERPL-MCNC: 382 UG/DL (ref 250–450)
WBC # BLD AUTO: 7.3 10^3/UL (ref 4–10)

## 2020-12-22 ENCOUNTER — HOSPITAL ENCOUNTER (OUTPATIENT)
Dept: HOSPITAL 53 - M SFHCPLAZ | Age: 60
End: 2020-12-22
Attending: PHYSICIAN ASSISTANT
Payer: COMMERCIAL

## 2020-12-22 DIAGNOSIS — K76.0: Primary | ICD-10-CM

## 2020-12-22 LAB — H PYLORI IGG SER IA-ACNC: 1.14 (ref 0–0.79)

## 2021-02-25 ENCOUNTER — HOSPITAL ENCOUNTER (OUTPATIENT)
Dept: HOSPITAL 53 - M LABDRWAD | Age: 61
End: 2021-02-25
Attending: INTERNAL MEDICINE
Payer: COMMERCIAL

## 2021-02-25 DIAGNOSIS — R94.5: ICD-10-CM

## 2021-02-25 DIAGNOSIS — E80.4: ICD-10-CM

## 2021-02-25 DIAGNOSIS — K74.60: Primary | ICD-10-CM

## 2021-02-25 LAB
ALBUMIN SERPL BCG-MCNC: 3.3 GM/DL (ref 3.2–5.2)
ALT SERPL W P-5'-P-CCNC: 62 U/L (ref 12–78)
BILIRUB CONJ SERPL-MCNC: 0.4 MG/DL (ref 0–0.2)
BILIRUB SERPL-MCNC: 1.5 MG/DL (ref 0.2–1)
FERRITIN SERPL-MCNC: 49 NG/ML (ref 26–388)
IRON SATN MFR SERPL: 26.6 % (ref 19.7–50)
IRON SERPL-MCNC: 84 UG/DL (ref 65–175)
PROT SERPL-MCNC: 7.2 GM/DL (ref 6.4–8.2)
TIBC SERPL-MCNC: 316 UG/DL (ref 250–450)
TSH SERPL DL<=0.005 MIU/L-ACNC: 1.82 UIU/ML (ref 0.36–3.74)

## 2021-03-01 LAB
ALBUMIN MFR UR ELPH: 52.3 % (ref 55.8–66.1)
ALBUMIN SERPL-MCNC: 3.77 GM/DL (ref 3.29–5.55)
ALPHA1 GLOB MFR SERPL ELPH: 4.3 % (ref 2.9–4.9)
ALPHA1 GLOB SERPL ELPH-MCNC: 0.31 GM/DL (ref 0.17–0.41)
ALPHA2 GLOB SERPL ELPH-MCNC: 0.81 GM/DL (ref 0.42–0.99)
ALPHA2 GLOB SERPL ELPH-MCNC: 11.2 % (ref 7.1–11.8)
B-GLOBULIN SERPL ELPH-MCNC: 0.42 GM/DL (ref 0.28–0.6)
BETA1 GLOB MFR SERPL ELPH: 5.9 % (ref 4.7–7.2)
BETA2 GLOB MFR SERPL ELPH: 5.6 % (ref 3.2–6.5)
BETA2 GLOB SERPL ELPH-MCNC: 0.4 GM/DL (ref 0.19–0.55)
GAMMA GLOB 24H MFR UR ELPH: 20.7 % (ref 11.1–18.8)
GAMMA GLOB SERPL ELPH-MCNC: 1.49 GM/DL (ref 0.65–1.58)
PROT PATTERN SERPL ELPH-IMP: (no result)

## 2021-03-02 ENCOUNTER — HOSPITAL ENCOUNTER (OUTPATIENT)
Dept: HOSPITAL 53 - M LABDRWAD | Age: 61
End: 2021-03-02
Attending: INTERNAL MEDICINE
Payer: COMMERCIAL

## 2021-03-02 DIAGNOSIS — R94.5: ICD-10-CM

## 2021-03-02 DIAGNOSIS — E80.4: ICD-10-CM

## 2021-03-02 DIAGNOSIS — K74.60: Primary | ICD-10-CM

## 2021-03-02 LAB
APTT BLD: 39.3 SECONDS (ref 24.2–38.5)
HBV SURFACE AB SER QL: POSITIVE
HBV SURFACE AB SER-ACNC: NEGATIVE M[IU]/ML
HCV AB SER QL: < 0 INDEX (ref ?–0.8)
HEPATITIS A ANTIBODY IGM: NEGATIVE
INR PPP: 1.11
PROTHROMBIN TIME: 14.5 SECONDS (ref 12.5–14.3)

## 2021-04-03 ENCOUNTER — HOSPITAL ENCOUNTER (OUTPATIENT)
Dept: HOSPITAL 53 - M LABSMTC | Age: 61
End: 2021-04-03
Attending: ANESTHESIOLOGY
Payer: COMMERCIAL

## 2021-04-03 DIAGNOSIS — Z01.812: Primary | ICD-10-CM

## 2021-04-03 DIAGNOSIS — Z20.822: ICD-10-CM

## 2021-04-08 ENCOUNTER — HOSPITAL ENCOUNTER (OUTPATIENT)
Dept: HOSPITAL 53 - M OPP | Age: 61
Discharge: HOME | End: 2021-04-08
Attending: SURGERY
Payer: COMMERCIAL

## 2021-04-08 VITALS — DIASTOLIC BLOOD PRESSURE: 59 MMHG | SYSTOLIC BLOOD PRESSURE: 118 MMHG

## 2021-04-08 VITALS — WEIGHT: 236 LBS | HEIGHT: 71 IN | BODY MASS INDEX: 33.04 KG/M2

## 2021-04-08 DIAGNOSIS — R10.13: ICD-10-CM

## 2021-04-08 DIAGNOSIS — Z86.010: ICD-10-CM

## 2021-04-08 DIAGNOSIS — K31.89: ICD-10-CM

## 2021-04-08 DIAGNOSIS — Z12.11: Primary | ICD-10-CM

## 2021-04-08 DIAGNOSIS — K63.5: ICD-10-CM

## 2021-04-08 DIAGNOSIS — Z79.84: ICD-10-CM

## 2021-04-08 DIAGNOSIS — Z88.8: ICD-10-CM

## 2021-04-08 DIAGNOSIS — Z79.82: ICD-10-CM

## 2021-04-08 DIAGNOSIS — Z79.899: ICD-10-CM

## 2021-04-08 NOTE — ROOR
________________________________________________________________________________

Patient Name: Hima Yao            Procedure Date: 4/8/2021 1:52 PM

MRN: [id number]                          Account Number: [id number]

YOB: 1960               Age: 61

Room: [facility]                            Gender: Male

Note Status: Finalized                 

________________________________________________________________________________

 

Procedure:            Colonoscopy

Indications:          High risk colon cancer surveillance: Personal history of 

                      colonic polyps

Providers:            Leo J. Gosselin Jr, MD

Referring MD:         Adriel Harris MD

Requesting Provider:  

Medicines:            Propofol per Anesthesia

Complications:        No immediate complications.

________________________________________________________________________________

Procedure:            Pre-Anesthesia Assessment:

                      - Prior to the procedure, a History and Physical was 

                      performed, and patient medications and allergies were 

                      reviewed. The patient is competent. The risks and 

                      benefits of the procedure and the sedation options and 

                      risks were discussed with the patient. All questions 

                      were answered and informed consent was obtained. Patient 

                      identification and proposed procedure were verified by 

                      the physician and the nurse in the pre-procedure area 

                      and in the procedure room. Mental Status Examination: 

                      alert and oriented. Airway Examination: normal 

                      oropharyngeal airway and neck mobility. Respiratory 

                      Examination: clear to auscultation. CV Examination: 

                      normal. ASA Grade Assessment: II - A patient with mild 

                      systemic disease. After reviewing the risks and 

                      benefits, the patient was deemed in satisfactory 

                      condition to undergo the procedure. The anesthesia plan 

                      was to use moderate sedation / analgesia (conscious 

                      sedation). Immediately prior to administration of 

                      medications, the patient was re-assessed for adequacy to 

                      receive sedatives. The heart rate, respiratory rate, 

                      oxygen saturations, blood pressure, adequacy of 

                      pulmonary ventilation, and response to care were 

                      monitored throughout the procedure. The physical status 

                      of the patient was re-assessed after the procedure.

                      The Colonoscope was introduced through the anus and 

                      advanced to the cecum, identified by appendiceal orifice 

                      and ileocecal valve. The colonoscopy was performed 

                      without difficulty. The patient tolerated the procedure 

                      well. The quality of the bowel preparation was fair.

                                                                                

Findings:

     The rectum, recto-sigmoid colon, sigmoid colon, cecum, appendiceal 

     orifice and ileocecal valve appeared normal.

     Five polyps were found in the descending colon, transverse colon and 

     ascending colon. The polyps were small in size. These polyps were removed 

     with a hot snare. Resection was complete, but the polyp tissue was only 

     partially retrieved.

                                                                                

Impression:           - Preparation of the colon was fair.

                      - The rectum, recto-sigmoid colon, sigmoid colon, cecum, 

                      appendiceal orifice and ileocecal valve are normal.

                      - Five small polyps in the descending colon, in the 

                      transverse colon and in the ascending colon, removed 

                      with a hot snare. Complete resection. Partial retrieval.

Recommendation:       - Discharge patient to home (ambulatory).

                      - Repeat colonoscopy in 5 years for surveillance.

                                                                                

Procedure Code(s):    --- Professional ---

                      18227, Colonoscopy, flexible; with removal of tumor(s), 

                      polyp(s), or other lesion(s) by snare technique

Diagnosis Code(s):    --- Professional ---

                      Z86.010, Personal history of colonic polyps

                      K63.5, Polyp of colon

 

CPT copyright 2019 [employer]. All rights reserved.

 

The codes documented in this report are preliminary and upon  review may 

be revised to meet current compliance requirements.

 

Leo Gosselin MD

_____________________

Leo J Gosselin Jr, MD

4/8/2021 2:11:21 PM

Electronically signed by Leo Gosselin Jr , MD

Number of Addenda: 0

 

Note Initiated On: 4/8/2021 1:52 PM

Estimated Blood Loss: Estimated blood loss: none.

## 2021-04-08 NOTE — ROOR
________________________________________________________________________________

Patient Name: Hima Yao            Procedure Date: 4/8/2021 1:38 PM

MRN: [id number]                          Account Number: [id number]

YOB: 1960               Age: 61

Room: [facility]                            Gender: Male

Note Status: Finalized                 

________________________________________________________________________________

 

Procedure:            Upper GI endoscopy

Indications:          Epigastric abdominal pain

Providers:            Leo J. Gosselin Jr, MD

Referring MD:         Adriel Harris MD

Requesting Provider:  

Medicines:            Propofol per Anesthesia

Complications:        No immediate complications.

________________________________________________________________________________

Procedure:            Pre-Anesthesia Assessment:

                      - Prior to the procedure, a History and Physical was 

                      performed, and patient medications and allergies were 

                      reviewed. The patient is competent. The risks and 

                      benefits of the procedure and the sedation options and 

                      risks were discussed with the patient. All questions 

                      were answered and informed consent was obtained. Patient 

                      identification and proposed procedure were verified by 

                      the physician and the nurse in the pre-procedure area 

                      and in the procedure room. Mental Status Examination: 

                      alert and oriented. Airway Examination: normal 

                      oropharyngeal airway and neck mobility. Respiratory 

                      Examination: clear to auscultation. CV Examination: 

                      normal. ASA Grade Assessment: II - A patient with mild 

                      systemic disease. After reviewing the risks and 

                      benefits, the patient was deemed in satisfactory 

                      condition to undergo the procedure. The anesthesia plan 

                      was to use moderate sedation / analgesia (conscious 

                      sedation). Immediately prior to administration of 

                      medications, the patient was re-assessed for adequacy to 

                      receive sedatives. The heart rate, respiratory rate, 

                      oxygen saturations, blood pressure, adequacy of 

                      pulmonary ventilation, and response to care were 

                      monitored throughout the procedure. The physical status 

                      of the patient was re-assessed after the procedure.

                      The Endoscope was introduced through the mouth, and 

                      advanced to the second part of duodenum. The upper GI 

                      endoscopy was accomplished without difficulty. The 

                      patient tolerated the procedure well.

                                                                                

Findings:

     The upper third of the esophagus, middle third of the esophagus and lower 

     third of the esophagus were normal.

     Diffuse mildly erythematous mucosa without bleeding was found in the 

     gastric body, in the gastric antrum and in the prepyloric region of the 

     stomach. Biopsies were taken with a cold forceps for histology.

     The duodenal bulb, first portion of the duodenum and second portion of 

     the duodenum were normal.

                                                                                

Impression:           - Normal upper third of esophagus, middle third of 

                      esophagus and lower third of esophagus.

                      - Erythematous mucosa in the gastric body, antrum and 

                      prepyloric region of the stomach. Biopsied.

                      - Normal duodenal bulb, first portion of the duodenum 

                      and second portion of the duodenum.

Recommendation:       - Discharge patient to home (ambulatory).

                      - Return to my office in 2 weeks.

                                                                                

Procedure Code(s):    --- Professional ---

                      89686, Esophagogastroduodenoscopy, flexible, transoral; 

                      with biopsy, single or multiple

Diagnosis Code(s):    --- Professional ---

                      K31.89, Other diseases of stomach and duodenum

                      R10.13, Epigastric pain

 

CPT copyright 2019 [employer]. All rights reserved.

 

The codes documented in this report are preliminary and upon  review may 

be revised to meet current compliance requirements.

 

Leo Gosselin MD

_____________________

Leo J Gosselin Jr, MD

4/8/2021 1:50:34 PM

Electronically signed by Leo Gosselin Jr , MD

Number of Addenda: 0

 

Note Initiated On: 4/8/2021 1:38 PM

Estimated Blood Loss: Estimated blood loss: none.

## 2021-04-13 ENCOUNTER — HOSPITAL ENCOUNTER (OUTPATIENT)
Dept: HOSPITAL 53 - M SFHCADAM | Age: 61
End: 2021-04-13
Attending: FAMILY MEDICINE
Payer: COMMERCIAL

## 2021-04-13 DIAGNOSIS — Z12.5: ICD-10-CM

## 2021-04-13 DIAGNOSIS — E13.9: ICD-10-CM

## 2021-04-13 DIAGNOSIS — I25.10: Primary | ICD-10-CM

## 2021-04-13 DIAGNOSIS — E78.2: ICD-10-CM

## 2021-04-13 LAB
ALBUMIN SERPL BCG-MCNC: 3.5 GM/DL (ref 3.2–5.2)
ALT SERPL W P-5'-P-CCNC: 43 U/L (ref 12–78)
BILIRUB SERPL-MCNC: 1.6 MG/DL (ref 0.2–1)
BUN SERPL-MCNC: 21 MG/DL (ref 7–18)
CALCIUM SERPL-MCNC: 9.7 MG/DL (ref 8.8–10.2)
CHLORIDE SERPL-SCNC: 106 MEQ/L (ref 98–107)
CHOLEST SERPL-MCNC: 211 MG/DL (ref ?–200)
CHOLEST/HDLC SERPL: 6.81 {RATIO} (ref ?–5)
CO2 SERPL-SCNC: 26 MEQ/L (ref 21–32)
CREAT SERPL-MCNC: 1.31 MG/DL (ref 0.7–1.3)
CREAT UR-MCNC: 72.4 MG/DL
EST. AVERAGE GLUCOSE BLD GHB EST-MCNC: 157 MG/DL (ref 60–110)
GFR SERPL CREATININE-BSD FRML MDRD: 59.2 ML/MIN/{1.73_M2} (ref 49–?)
GLUCOSE SERPL-MCNC: 176 MG/DL (ref 70–100)
HCT VFR BLD AUTO: 41.8 % (ref 42–52)
HDLC SERPL-MCNC: 31 MG/DL (ref 40–?)
HGB BLD-MCNC: 13.8 G/DL (ref 13.5–17.5)
MCH RBC QN AUTO: 30.5 PG (ref 27–33)
MCHC RBC AUTO-ENTMCNC: 33 G/DL (ref 32–36.5)
MCV RBC AUTO: 92.3 FL (ref 80–96)
MICROALBUMIN UR-MCNC: < 5 MG/L
MICROALBUMIN/CREAT UR: 6.9 MCG/MG (ref 0–30)
NONHDLC SERPL-MCNC: 180 MG/DL
PLATELET # BLD AUTO: 94 10^3/UL (ref 150–450)
POTASSIUM SERPL-SCNC: 3.5 MEQ/L (ref 3.5–5.1)
PROT SERPL-MCNC: 7.5 GM/DL (ref 6.4–8.2)
RBC # BLD AUTO: 4.53 10^6/UL (ref 4.3–6.1)
SODIUM SERPL-SCNC: 142 MEQ/L (ref 136–145)
TRIGL SERPL-MCNC: 457 MG/DL (ref ?–150)
WBC # BLD AUTO: 6.1 10^3/UL (ref 4–10)

## 2021-04-16 ENCOUNTER — HOSPITAL ENCOUNTER (OUTPATIENT)
Dept: HOSPITAL 53 - M RAD | Age: 61
End: 2021-04-16
Attending: INTERNAL MEDICINE
Payer: COMMERCIAL

## 2021-04-16 DIAGNOSIS — K74.60: Primary | ICD-10-CM

## 2021-04-16 DIAGNOSIS — R94.5: ICD-10-CM

## 2021-04-16 DIAGNOSIS — R10.84: ICD-10-CM

## 2021-04-16 DIAGNOSIS — R16.2: ICD-10-CM

## 2021-04-16 PROCEDURE — 78215 LVR&SPLEEN IMG STATIC ONLY: CPT

## 2021-04-16 PROCEDURE — 76700 US EXAM ABDOM COMPLETE: CPT

## 2021-04-16 NOTE — REP
INDICATION:

CIRRHOSIS- US FIRST.



COMPARISON:

None



TECHNIQUE:

After the intravenous administration of 6.6 mCi of technetium 99 M sulfur colloid a

liver-spleen scintiscan was obtained.



FINDINGS:

The measured hepatic dimension is 22.5 by 15.9 cm.  The measured splenic dimension is

13.3 x 12.4 cm.  The activity throughout the liver is greater than that throughout the

spleen.  No frankly abnormal increased or decreased radionuclide accumulation is seen

within either the liver or spleen.  There is no definite evidence of a colloid shift.



IMPRESSION:

Hepatosplenomegaly as described above.





<Electronically signed by Chirag Martines > 04/16/21 [id number]

## 2021-04-16 NOTE — REP
INDICATION:

CIRRHOSIS.



COMPARISON:

06/07/2019 a limited exam



TECHNIQUE:

Transabdominal ultrasound



FINDINGS:

Multiple ultrasonographic images of the liver show the hepatic parenchymal echo

pattern to be increased in a fashion unchanged compared to the prior exam..  There is

no intrahepatic or extrahepatic ductal dilatation.  The common bile duct measures 5.3

mm.  The patient is status post cholecystectomy.  The imaged portion of the pancreas

is within normal limits.

The spleen measures  13.9 x 13.5 x 8.6 cm.  No splenic or perisplenic abnormalities

are noted.  The right kidney measures 11.0 x 4.8  x 5.9 cm .  The renal cortical

echotexture is within normal limits.  Corticomedullary differentiation is preserved.

There is no hydronephrosis.  There are no masses.  The left kidney measures 11.7 x 5.1

x 5.8 cm.  The renal cortical echotexture is within normal limits.  Corticomedullary

differentiation is preserved.  There is no hydronephrosis.  There are no masses.  The

imaged portion of the abdominal aorta is within normal limits.  There is no evidence

of free fluid.



IMPRESSION:

1. There is a coarsened echo pattern throughout the hepatic parenchyma unchanged from

the prior exam and consistent with the patient's clinical diagnosis of cirrhosis.

2. Splenomegaly





<Electronically signed by Chirag Martines > 04/16/21 [id number]

## 2021-05-27 ENCOUNTER — HOSPITAL ENCOUNTER (OUTPATIENT)
Dept: HOSPITAL 53 - M RAD | Age: 61
End: 2021-05-27
Attending: INTERNAL MEDICINE
Payer: COMMERCIAL

## 2021-05-27 DIAGNOSIS — R11.0: Primary | ICD-10-CM

## 2021-05-28 NOTE — REP
INDICATION:

NAUSEA, ABD PAIN.



COMPARISON:

None.



TECHNIQUE:

The procedure was performed under the direct supervision of Dr. Garner.  The images were

reviewed with Dr. Garner.



Liquid barium and gas producing crystals were given in the erect position as well as

liquid barium in the prone oblique position in order to perform a double contrast

upper GI examination. Additionally liquid barium was given at the end of the

examination in order to perform a small bowel follow through. A combination od

fluoroscopy, spot films and last image hold technology was utilized,



2.7 minutes of fluoro time was utilized for this procedure.



FINDINGS:

The  film shows no organomegaly or pathological masses. The intestinal gas

pattern is non-specific.  The patient is status post right hip arthroplasty.  There

are surgical clips noted in the right abdomen.



The oral and pharyngeal stages of deglutition are unremarkable.  During esophageal

transport there are tertiary waves demonstrated.  There is no esophagitis, stricture,

mucosal ring or hiatal hernia.



The stomach walls are normally outlined. The rugal folds are smooth and regular.

There is no gastritis neoplasm or ulcer disease.



The duodenal walls are normally outlined .  The mucosal folds are smooth and regular.

There is no duodenitis pancreatitis peptic ulcer disease or neoplasm. The visualized

portion of the proximal small bowel appears normal in course and caliber.



The barium column was followed through the small bowel to the level of the terminal

ileum.  Small bowel transit time is approximately 90 minutes.  During fluoroscopy

gentle palpation shows all loops are freely movable and pliable. There are no fixed or

angulated loops. The small bowel mucosal pattern is normal in course and caliber.

There is no transition to suggest a partial small-bowel obstruction.  Spot filming of

the terminal ileum shows it to be unremarkable.



IMPRESSION:

There are tertiary waves demonstrated.  Otherwise, unremarkable double contrast upper

GI  and small bowel follow through examination.



<Electronically signed by Shar Canas > 05/27/21 [id number]

<Electronically signed by Omar Garner > 05/28/21 [id number]

## 2021-06-03 ENCOUNTER — HOSPITAL ENCOUNTER (OUTPATIENT)
Dept: HOSPITAL 53 - M LAB | Age: 61
End: 2021-06-03
Attending: INTERNAL MEDICINE
Payer: COMMERCIAL

## 2021-06-03 ENCOUNTER — HOSPITAL ENCOUNTER (OUTPATIENT)
Dept: HOSPITAL 53 - M RAD | Age: 61
End: 2021-06-03
Attending: INTERNAL MEDICINE
Payer: COMMERCIAL

## 2021-06-03 DIAGNOSIS — K57.90: ICD-10-CM

## 2021-06-03 DIAGNOSIS — I85.00: ICD-10-CM

## 2021-06-03 DIAGNOSIS — R16.2: ICD-10-CM

## 2021-06-03 DIAGNOSIS — R11.0: ICD-10-CM

## 2021-06-03 DIAGNOSIS — R10.84: Primary | ICD-10-CM

## 2021-06-03 LAB
BUN SERPL-MCNC: 17 MG/DL (ref 7–18)
CALCIUM SERPL-MCNC: 9.3 MG/DL (ref 8.8–10.2)
CHLORIDE SERPL-SCNC: 105 MEQ/L (ref 98–107)
CO2 SERPL-SCNC: 25 MEQ/L (ref 21–32)
CREAT SERPL-MCNC: 1.31 MG/DL (ref 0.7–1.3)
GFR SERPL CREATININE-BSD FRML MDRD: 59.2 ML/MIN/{1.73_M2} (ref 49–?)
GLUCOSE SERPL-MCNC: 184 MG/DL (ref 70–100)
POTASSIUM SERPL-SCNC: 3.7 MEQ/L (ref 3.5–5.1)
SODIUM SERPL-SCNC: 140 MEQ/L (ref 136–145)

## 2021-06-03 PROCEDURE — 74170 CT ABD WO CNTRST FLWD CNTRST: CPT

## 2021-06-04 NOTE — REP
INDICATION:

ABD PAIN.



COMPARISON:

12/16/2020



TECHNIQUE:

Axial pre and post contrast-enhanced images of the abdomen using oral and 100 cc

Isovue 370 intravenous contrast material.  Coronal and sagittal reformations obtained..



This CT examination was performed using the following dose reduction techniques:

Automated exposure control, adjustment of mA and/or kv according to the patient's

size, and the use of iterative reconstruction technique.



FINDINGS:

Hepatosplenomegaly and portosystemic shunting is appreciated suggesting cirrhosis and

portal hypertension.  Esophageal varices are also identified and again consistent with

cirrhosis and portal hypertension.



Patient is status post cholecystectomy.  Pancreas, bilateral adrenal glands and

kidneys are relatively normal.



The visualized enteric system is without obstruction or acute inflammatory process.

Normal terminal ileum and appendix identified in the right lower quadrant.  Colonic

diverticulosis noted without acute diverticulitis.



No ascites.  No free air.  No intraperitoneal or retroperitoneal adenopathy.

Atherosclerotic changes to the aorta without aneurysm or dissection.  Musculoskeletal

structures are intact and without acute osseous abnormality.



IMPRESSION:

1.   Hepatosplenomegaly and portosystemic shunting including gastric and esophageal

varices consistent with cirrhosis and portal hypertension.

2.  Colonic diverticulosis.





<Electronically signed by José Miguel Clayton > 06/04/21 [id number]

## 2021-11-10 ENCOUNTER — HOSPITAL ENCOUNTER (OUTPATIENT)
Dept: HOSPITAL 53 - M SFHCADAM | Age: 61
End: 2021-11-10
Attending: FAMILY MEDICINE
Payer: COMMERCIAL

## 2021-11-10 DIAGNOSIS — E13.9: Primary | ICD-10-CM

## 2021-11-10 LAB
BUN SERPL-MCNC: 23 MG/DL (ref 7–18)
CALCIUM SERPL-MCNC: 9.8 MG/DL (ref 8.8–10.2)
CHLORIDE SERPL-SCNC: 104 MEQ/L (ref 98–107)
CO2 SERPL-SCNC: 25 MEQ/L (ref 21–32)
CREAT SERPL-MCNC: 1.34 MG/DL (ref 0.7–1.3)
EST. AVERAGE GLUCOSE BLD GHB EST-MCNC: 166 MG/DL (ref 60–110)
GFR SERPL CREATININE-BSD FRML MDRD: 57.7 ML/MIN/{1.73_M2} (ref 49–?)
GLUCOSE SERPL-MCNC: 148 MG/DL (ref 70–100)
POTASSIUM SERPL-SCNC: 4.1 MEQ/L (ref 3.5–5.1)
SODIUM SERPL-SCNC: 137 MEQ/L (ref 136–145)

## 2022-01-12 ENCOUNTER — HOSPITAL ENCOUNTER (OUTPATIENT)
Dept: HOSPITAL 53 - M RAD | Age: 62
End: 2022-01-12
Attending: INTERNAL MEDICINE
Payer: COMMERCIAL

## 2022-01-12 ENCOUNTER — HOSPITAL ENCOUNTER (OUTPATIENT)
Dept: HOSPITAL 53 - M LAB | Age: 62
End: 2022-01-12
Attending: INTERNAL MEDICINE
Payer: COMMERCIAL

## 2022-01-12 DIAGNOSIS — Z90.49: ICD-10-CM

## 2022-01-12 DIAGNOSIS — R11.0: ICD-10-CM

## 2022-01-12 DIAGNOSIS — E80.4: ICD-10-CM

## 2022-01-12 DIAGNOSIS — R10.84: ICD-10-CM

## 2022-01-12 DIAGNOSIS — Z14.8: ICD-10-CM

## 2022-01-12 DIAGNOSIS — K74.60: Primary | ICD-10-CM

## 2022-01-12 DIAGNOSIS — K76.6: ICD-10-CM

## 2022-01-12 DIAGNOSIS — I85.00: ICD-10-CM

## 2022-01-12 LAB
ALBUMIN SERPL BCG-MCNC: 3 GM/DL (ref 3.2–5.2)
ALT SERPL W P-5'-P-CCNC: 58 U/L (ref 12–78)
BILIRUB SERPL-MCNC: 1.6 MG/DL (ref 0.2–1)
BUN SERPL-MCNC: 20 MG/DL (ref 7–18)
CALCIUM SERPL-MCNC: 9.7 MG/DL (ref 8.8–10.2)
CHLORIDE SERPL-SCNC: 108 MEQ/L (ref 98–107)
CO2 SERPL-SCNC: 27 MEQ/L (ref 21–32)
CREAT SERPL-MCNC: 1.29 MG/DL (ref 0.7–1.3)
GFR SERPL CREATININE-BSD FRML MDRD: > 60 ML/MIN/{1.73_M2} (ref 49–?)
GLUCOSE SERPL-MCNC: 174 MG/DL (ref 70–100)
HCT VFR BLD AUTO: 40.3 % (ref 42–52)
HGB BLD-MCNC: 13 G/DL (ref 13.5–17.5)
INR PPP: 1.11
MCH RBC QN AUTO: 28.8 PG (ref 27–33)
MCHC RBC AUTO-ENTMCNC: 32.3 G/DL (ref 32–36.5)
MCV RBC AUTO: 89.4 FL (ref 80–96)
PLATELET # BLD AUTO: 101 10^3/UL (ref 150–450)
POTASSIUM SERPL-SCNC: 4.3 MEQ/L (ref 3.5–5.1)
PROT SERPL-MCNC: 7.7 GM/DL (ref 6.4–8.2)
PROTHROMBIN TIME: 14.7 SECONDS (ref 12.7–14.5)
RBC # BLD AUTO: 4.51 10^6/UL (ref 4.3–6.1)
SODIUM SERPL-SCNC: 143 MEQ/L (ref 136–145)
WBC # BLD AUTO: 5.9 10^3/UL (ref 4–10)

## 2022-03-18 ENCOUNTER — HOSPITAL ENCOUNTER (OUTPATIENT)
Dept: HOSPITAL 53 - M RAD | Age: 62
End: 2022-03-18
Payer: COMMERCIAL

## 2022-03-18 DIAGNOSIS — R10.84: ICD-10-CM

## 2022-03-18 DIAGNOSIS — R68.81: Primary | ICD-10-CM

## 2022-03-18 DIAGNOSIS — K74.60: ICD-10-CM

## 2022-03-18 DIAGNOSIS — I85.10: ICD-10-CM

## 2022-03-18 DIAGNOSIS — R19.7: ICD-10-CM

## 2022-03-18 DIAGNOSIS — E80.4: ICD-10-CM

## 2022-03-18 PROCEDURE — 78264 GASTRIC EMPTYING IMG STUDY: CPT

## 2022-03-19 ENCOUNTER — HOSPITAL ENCOUNTER (INPATIENT)
Dept: HOSPITAL 53 - M ED | Age: 62
LOS: 2 days | Discharge: HOME | DRG: 201 | End: 2022-03-21
Attending: INTERNAL MEDICINE | Admitting: INTERNAL MEDICINE
Payer: COMMERCIAL

## 2022-03-19 VITALS — SYSTOLIC BLOOD PRESSURE: 114 MMHG | DIASTOLIC BLOOD PRESSURE: 67 MMHG

## 2022-03-19 VITALS — SYSTOLIC BLOOD PRESSURE: 121 MMHG | DIASTOLIC BLOOD PRESSURE: 83 MMHG

## 2022-03-19 VITALS — WEIGHT: 239.2 LBS | HEIGHT: 71 IN | BODY MASS INDEX: 33.49 KG/M2

## 2022-03-19 VITALS — DIASTOLIC BLOOD PRESSURE: 95 MMHG | SYSTOLIC BLOOD PRESSURE: 136 MMHG

## 2022-03-19 DIAGNOSIS — Z88.8: ICD-10-CM

## 2022-03-19 DIAGNOSIS — T78.3XXA: ICD-10-CM

## 2022-03-19 DIAGNOSIS — K76.6: ICD-10-CM

## 2022-03-19 DIAGNOSIS — K76.0: ICD-10-CM

## 2022-03-19 DIAGNOSIS — Z79.84: ICD-10-CM

## 2022-03-19 DIAGNOSIS — I12.9: ICD-10-CM

## 2022-03-19 DIAGNOSIS — E86.0: ICD-10-CM

## 2022-03-19 DIAGNOSIS — I48.92: Primary | ICD-10-CM

## 2022-03-19 DIAGNOSIS — E78.5: ICD-10-CM

## 2022-03-19 DIAGNOSIS — K86.1: ICD-10-CM

## 2022-03-19 DIAGNOSIS — N18.31: ICD-10-CM

## 2022-03-19 DIAGNOSIS — K74.60: ICD-10-CM

## 2022-03-19 DIAGNOSIS — Z20.822: ICD-10-CM

## 2022-03-19 DIAGNOSIS — N17.9: ICD-10-CM

## 2022-03-19 DIAGNOSIS — D69.6: ICD-10-CM

## 2022-03-19 DIAGNOSIS — Z95.5: ICD-10-CM

## 2022-03-19 DIAGNOSIS — I86.4: ICD-10-CM

## 2022-03-19 DIAGNOSIS — E80.4: ICD-10-CM

## 2022-03-19 DIAGNOSIS — R74.01: ICD-10-CM

## 2022-03-19 DIAGNOSIS — I25.10: ICD-10-CM

## 2022-03-19 DIAGNOSIS — Z90.49: ICD-10-CM

## 2022-03-19 DIAGNOSIS — I24.8: ICD-10-CM

## 2022-03-19 DIAGNOSIS — Z79.899: ICD-10-CM

## 2022-03-19 DIAGNOSIS — E11.22: ICD-10-CM

## 2022-03-19 DIAGNOSIS — Z79.82: ICD-10-CM

## 2022-03-19 DIAGNOSIS — R00.0: ICD-10-CM

## 2022-03-19 DIAGNOSIS — K52.9: ICD-10-CM

## 2022-03-19 LAB
ALBUMIN SERPL BCG-MCNC: 3 GM/DL (ref 3.2–5.2)
ALT SERPL W P-5'-P-CCNC: 60 U/L (ref 12–78)
APTT BLD: 36.9 SECONDS (ref 25.9–37)
BASOPHILS # BLD AUTO: 0 10^3/UL (ref 0–0.2)
BASOPHILS NFR BLD AUTO: 0.5 % (ref 0–1)
BILIRUB CONJ SERPL-MCNC: 0.5 MG/DL (ref 0–0.2)
BILIRUB SERPL-MCNC: 1.7 MG/DL (ref 0.2–1)
BUN SERPL-MCNC: 32 MG/DL (ref 7–18)
CALCIUM SERPL-MCNC: 9.1 MG/DL (ref 8.8–10.2)
CHLORIDE SERPL-SCNC: 108 MEQ/L (ref 98–107)
CK MB CFR.DF SERPL CALC: 2.02
CK MB CFR.DF SERPL CALC: 2.33
CK MB SERPL-MCNC: 1.9 NG/ML (ref ?–3.6)
CK MB SERPL-MCNC: 2.1 NG/ML (ref ?–3.6)
CK SERPL-CCNC: 90 U/L (ref 39–308)
CK SERPL-CCNC: 94 U/L (ref 39–308)
CO2 SERPL-SCNC: 22 MEQ/L (ref 21–32)
CREAT SERPL-MCNC: 1.71 MG/DL (ref 0.7–1.3)
EOSINOPHIL # BLD AUTO: 0.2 10^3/UL (ref 0–0.5)
EOSINOPHIL NFR BLD AUTO: 2.9 % (ref 0–3)
GFR SERPL CREATININE-BSD FRML MDRD: 43.4 ML/MIN/{1.73_M2} (ref 49–?)
GLUCOSE SERPL-MCNC: 214 MG/DL (ref 70–100)
HCT VFR BLD AUTO: 40.3 % (ref 42–52)
HGB BLD-MCNC: 13.3 G/DL (ref 13.5–17.5)
INR PPP: 1.08
LIPASE SERPL-CCNC: 450 U/L (ref 73–393)
LYMPHOCYTES # BLD AUTO: 2 10^3/UL (ref 1.5–5)
LYMPHOCYTES NFR BLD AUTO: 34.8 % (ref 24–44)
MCH RBC QN AUTO: 29.4 PG (ref 27–33)
MCHC RBC AUTO-ENTMCNC: 33 G/DL (ref 32–36.5)
MCV RBC AUTO: 89.2 FL (ref 80–96)
MONOCYTES # BLD AUTO: 0.6 10^3/UL (ref 0–0.8)
MONOCYTES NFR BLD AUTO: 9.9 % (ref 2–8)
NEUTROPHILS # BLD AUTO: 3 10^3/UL (ref 1.5–8.5)
NEUTROPHILS NFR BLD AUTO: 51.7 % (ref 36–66)
NT-PRO BNP: 1817 PG/ML (ref ?–125)
PLATELET # BLD AUTO: 103 10^3/UL (ref 150–450)
POTASSIUM SERPL-SCNC: 4.1 MEQ/L (ref 3.5–5.1)
PROT SERPL-MCNC: 7.7 GM/DL (ref 6.4–8.2)
PROTHROMBIN TIME: 14.4 SECONDS (ref 12.7–14.5)
RBC # BLD AUTO: 4.52 10^6/UL (ref 4.3–6.1)
RSV RNA NPH QL NAA+PROBE: NEGATIVE
SODIUM SERPL-SCNC: 141 MEQ/L (ref 136–145)
T4 FREE SERPL-MCNC: 1.37 NG/DL (ref 0.76–1.46)
TSH SERPL DL<=0.005 MIU/L-ACNC: 2.05 UIU/ML (ref 0.36–3.74)
WBC # BLD AUTO: 5.8 10^3/UL (ref 4–10)

## 2022-03-19 RX ADMIN — METOPROLOL TARTRATE SCH MG: 5 INJECTION INTRAVENOUS at 17:40

## 2022-03-19 RX ADMIN — METOPROLOL TARTRATE SCH MG: 5 INJECTION INTRAVENOUS at 17:53

## 2022-03-19 RX ADMIN — ROSUVASTATIN CALCIUM SCH MG: 10 TABLET, FILM COATED ORAL at 22:41

## 2022-03-19 RX ADMIN — METOPROLOL TARTRATE SCH MG: 25 TABLET, FILM COATED ORAL at 23:03

## 2022-03-19 RX ADMIN — OMEPRAZOLE SCH MG: 20 CAPSULE, DELAYED RELEASE ORAL at 22:42

## 2022-03-19 RX ADMIN — LATANOPROST SCH DROP: 50 SOLUTION OPHTHALMIC at 21:00

## 2022-03-20 VITALS — SYSTOLIC BLOOD PRESSURE: 109 MMHG | DIASTOLIC BLOOD PRESSURE: 71 MMHG

## 2022-03-20 VITALS — DIASTOLIC BLOOD PRESSURE: 55 MMHG | SYSTOLIC BLOOD PRESSURE: 104 MMHG

## 2022-03-20 VITALS — DIASTOLIC BLOOD PRESSURE: 58 MMHG | SYSTOLIC BLOOD PRESSURE: 114 MMHG

## 2022-03-20 VITALS — DIASTOLIC BLOOD PRESSURE: 83 MMHG | SYSTOLIC BLOOD PRESSURE: 120 MMHG

## 2022-03-20 VITALS — SYSTOLIC BLOOD PRESSURE: 98 MMHG | DIASTOLIC BLOOD PRESSURE: 70 MMHG

## 2022-03-20 VITALS — SYSTOLIC BLOOD PRESSURE: 113 MMHG | DIASTOLIC BLOOD PRESSURE: 71 MMHG

## 2022-03-20 VITALS — OXYGEN SATURATION: 95 %

## 2022-03-20 VITALS — DIASTOLIC BLOOD PRESSURE: 73 MMHG | SYSTOLIC BLOOD PRESSURE: 127 MMHG

## 2022-03-20 VITALS — SYSTOLIC BLOOD PRESSURE: 134 MMHG | DIASTOLIC BLOOD PRESSURE: 85 MMHG

## 2022-03-20 VITALS — DIASTOLIC BLOOD PRESSURE: 65 MMHG | SYSTOLIC BLOOD PRESSURE: 112 MMHG

## 2022-03-20 VITALS — SYSTOLIC BLOOD PRESSURE: 133 MMHG | DIASTOLIC BLOOD PRESSURE: 64 MMHG

## 2022-03-20 VITALS — SYSTOLIC BLOOD PRESSURE: 99 MMHG | DIASTOLIC BLOOD PRESSURE: 61 MMHG

## 2022-03-20 VITALS — SYSTOLIC BLOOD PRESSURE: 101 MMHG | DIASTOLIC BLOOD PRESSURE: 57 MMHG

## 2022-03-20 VITALS — OXYGEN SATURATION: 96 %

## 2022-03-20 VITALS — DIASTOLIC BLOOD PRESSURE: 62 MMHG | SYSTOLIC BLOOD PRESSURE: 112 MMHG

## 2022-03-20 VITALS — SYSTOLIC BLOOD PRESSURE: 113 MMHG | DIASTOLIC BLOOD PRESSURE: 69 MMHG

## 2022-03-20 VITALS — OXYGEN SATURATION: 97 %

## 2022-03-20 LAB
BASOPHILS # BLD AUTO: 0 10^3/UL (ref 0–0.2)
BASOPHILS NFR BLD AUTO: 0.7 % (ref 0–1)
BUN SERPL-MCNC: 31 MG/DL (ref 7–18)
CALCIUM SERPL-MCNC: 8.7 MG/DL (ref 8.8–10.2)
CHLORIDE SERPL-SCNC: 111 MEQ/L (ref 98–107)
CO2 SERPL-SCNC: 24 MEQ/L (ref 21–32)
CREAT SERPL-MCNC: 1.41 MG/DL (ref 0.7–1.3)
CREAT UR-MCNC: 56.9 MG/DL
EOSINOPHIL # BLD AUTO: 0.2 10^3/UL (ref 0–0.5)
EOSINOPHIL NFR BLD AUTO: 4.2 % (ref 0–3)
EST. AVERAGE GLUCOSE BLD GHB EST-MCNC: 169 MG/DL (ref 60–110)
GFR SERPL CREATININE-BSD FRML MDRD: 54.2 ML/MIN/{1.73_M2} (ref 49–?)
GLUCOSE SERPL-MCNC: 101 MG/DL (ref 70–100)
HCT VFR BLD AUTO: 37.4 % (ref 42–52)
HGB BLD-MCNC: 12.5 G/DL (ref 13.5–17.5)
LYMPHOCYTES # BLD AUTO: 2.1 10^3/UL (ref 1.5–5)
LYMPHOCYTES NFR BLD AUTO: 37.3 % (ref 24–44)
MCH RBC QN AUTO: 29.3 PG (ref 27–33)
MCHC RBC AUTO-ENTMCNC: 33.4 G/DL (ref 32–36.5)
MCV RBC AUTO: 87.8 FL (ref 80–96)
MONOCYTES # BLD AUTO: 0.5 10^3/UL (ref 0–0.8)
MONOCYTES NFR BLD AUTO: 8.9 % (ref 2–8)
NEUTROPHILS # BLD AUTO: 2.7 10^3/UL (ref 1.5–8.5)
NEUTROPHILS NFR BLD AUTO: 48.7 % (ref 36–66)
PLATELET # BLD AUTO: 89 10^3/UL (ref 150–450)
POTASSIUM SERPL-SCNC: 3.5 MEQ/L (ref 3.5–5.1)
RBC # BLD AUTO: 4.26 10^6/UL (ref 4.3–6.1)
SODIUM SERPL-SCNC: 142 MEQ/L (ref 136–145)
SODIUM UR-SCNC: 124 MEQ/L
UUN UR-MCNC: 518 MG/DL
WBC # BLD AUTO: 5.5 10^3/UL (ref 4–10)

## 2022-03-20 RX ADMIN — INSULIN LISPRO SCH UNITS: 100 INJECTION, SOLUTION INTRAVENOUS; SUBCUTANEOUS at 17:30

## 2022-03-20 RX ADMIN — ROSUVASTATIN CALCIUM SCH MG: 10 TABLET, FILM COATED ORAL at 21:47

## 2022-03-20 RX ADMIN — APIXABAN SCH MG: 5 TABLET, FILM COATED ORAL at 21:47

## 2022-03-20 RX ADMIN — METOPROLOL TARTRATE SCH MG: 50 TABLET, FILM COATED ORAL at 21:48

## 2022-03-20 RX ADMIN — INSULIN LISPRO SCH UNITS: 100 INJECTION, SOLUTION INTRAVENOUS; SUBCUTANEOUS at 05:24

## 2022-03-20 RX ADMIN — APIXABAN SCH MG: 5 TABLET, FILM COATED ORAL at 08:19

## 2022-03-20 RX ADMIN — OMEPRAZOLE SCH MG: 20 CAPSULE, DELAYED RELEASE ORAL at 21:47

## 2022-03-20 RX ADMIN — METOPROLOL TARTRATE SCH MG: 50 TABLET, FILM COATED ORAL at 14:10

## 2022-03-20 RX ADMIN — TIMOLOL MALEATE SCH DROP: 5 SOLUTION OPHTHALMIC at 08:19

## 2022-03-20 RX ADMIN — LATANOPROST SCH DROP: 50 SOLUTION OPHTHALMIC at 21:49

## 2022-03-20 RX ADMIN — INSULIN LISPRO SCH UNITS: 100 INJECTION, SOLUTION INTRAVENOUS; SUBCUTANEOUS at 00:15

## 2022-03-20 RX ADMIN — METOPROLOL TARTRATE SCH MG: 25 TABLET, FILM COATED ORAL at 05:19

## 2022-03-20 RX ADMIN — INSULIN LISPRO SCH UNITS: 100 INJECTION, SOLUTION INTRAVENOUS; SUBCUTANEOUS at 12:43

## 2022-03-20 RX ADMIN — ASPIRIN SCH MG: 81 TABLET ORAL at 08:19

## 2022-03-21 VITALS — DIASTOLIC BLOOD PRESSURE: 54 MMHG | SYSTOLIC BLOOD PRESSURE: 108 MMHG

## 2022-03-21 VITALS — DIASTOLIC BLOOD PRESSURE: 53 MMHG | SYSTOLIC BLOOD PRESSURE: 97 MMHG

## 2022-03-21 VITALS — SYSTOLIC BLOOD PRESSURE: 97 MMHG | DIASTOLIC BLOOD PRESSURE: 53 MMHG

## 2022-03-21 VITALS — SYSTOLIC BLOOD PRESSURE: 125 MMHG | DIASTOLIC BLOOD PRESSURE: 65 MMHG

## 2022-03-21 LAB
BUN SERPL-MCNC: 28 MG/DL (ref 7–18)
CALCIUM SERPL-MCNC: 9.1 MG/DL (ref 8.8–10.2)
CHLORIDE SERPL-SCNC: 109 MEQ/L (ref 98–107)
CO2 SERPL-SCNC: 24 MEQ/L (ref 21–32)
CREAT SERPL-MCNC: 1.24 MG/DL (ref 0.7–1.3)
GFR SERPL CREATININE-BSD FRML MDRD: > 60 ML/MIN/{1.73_M2} (ref 49–?)
GLUCOSE SERPL-MCNC: 118 MG/DL (ref 70–100)
HCT VFR BLD AUTO: 38.7 % (ref 42–52)
HGB BLD-MCNC: 12.8 G/DL (ref 13.5–17.5)
MAGNESIUM SERPL-MCNC: 1.7 MG/DL (ref 1.8–2.4)
MCH RBC QN AUTO: 29.5 PG (ref 27–33)
MCHC RBC AUTO-ENTMCNC: 33.1 G/DL (ref 32–36.5)
MCV RBC AUTO: 89.2 FL (ref 80–96)
PLATELET # BLD AUTO: 102 10^3/UL (ref 150–450)
POTASSIUM SERPL-SCNC: 3.7 MEQ/L (ref 3.5–5.1)
RBC # BLD AUTO: 4.34 10^6/UL (ref 4.3–6.1)
SODIUM SERPL-SCNC: 141 MEQ/L (ref 136–145)
WBC # BLD AUTO: 5.6 10^3/UL (ref 4–10)

## 2022-03-21 RX ADMIN — ASPIRIN SCH MG: 81 TABLET ORAL at 08:17

## 2022-03-21 RX ADMIN — APIXABAN SCH MG: 5 TABLET, FILM COATED ORAL at 08:17

## 2022-03-21 RX ADMIN — METOPROLOL TARTRATE SCH MG: 50 TABLET, FILM COATED ORAL at 05:07

## 2022-03-21 RX ADMIN — TIMOLOL MALEATE SCH DROP: 5 SOLUTION OPHTHALMIC at 08:17

## 2022-03-21 RX ADMIN — INSULIN LISPRO SCH UNITS: 100 INJECTION, SOLUTION INTRAVENOUS; SUBCUTANEOUS at 08:17

## 2022-04-20 ENCOUNTER — HOSPITAL ENCOUNTER (OUTPATIENT)
Dept: HOSPITAL 53 - M SFHCCLAY | Age: 62
End: 2022-04-20
Attending: FAMILY MEDICINE
Payer: COMMERCIAL

## 2022-04-20 DIAGNOSIS — R19.7: ICD-10-CM

## 2022-04-20 DIAGNOSIS — I48.92: ICD-10-CM

## 2022-04-20 DIAGNOSIS — E11.9: ICD-10-CM

## 2022-04-20 DIAGNOSIS — I48.91: Primary | ICD-10-CM

## 2022-04-20 LAB
ALBUMIN SERPL BCG-MCNC: 3.1 GM/DL (ref 3.2–5.2)
ALT SERPL W P-5'-P-CCNC: 66 U/L (ref 12–78)
BASOPHILS # BLD AUTO: 0 10^3/UL (ref 0–0.2)
BASOPHILS NFR BLD AUTO: 0.5 % (ref 0–1)
BILIRUB SERPL-MCNC: 1.5 MG/DL (ref 0.2–1)
BUN SERPL-MCNC: 29 MG/DL (ref 7–18)
CALCIUM SERPL-MCNC: 9.6 MG/DL (ref 8.8–10.2)
CHLORIDE SERPL-SCNC: 107 MEQ/L (ref 98–107)
CHOLEST SERPL-MCNC: 251 MG/DL (ref ?–200)
CHOLEST/HDLC SERPL: 8.96 {RATIO} (ref ?–5)
CO2 SERPL-SCNC: 28 MEQ/L (ref 21–32)
CREAT SERPL-MCNC: 1.37 MG/DL (ref 0.7–1.3)
EOSINOPHIL # BLD AUTO: 0.2 10^3/UL (ref 0–0.5)
EOSINOPHIL NFR BLD AUTO: 3.3 % (ref 0–3)
EST. AVERAGE GLUCOSE BLD GHB EST-MCNC: 174 MG/DL (ref 60–110)
GFR SERPL CREATININE-BSD FRML MDRD: 56.1 ML/MIN/{1.73_M2} (ref 49–?)
GLUCOSE SERPL-MCNC: 142 MG/DL (ref 70–100)
HCT VFR BLD AUTO: 38.9 % (ref 42–52)
HDLC SERPL-MCNC: 28 MG/DL (ref 40–?)
HGB BLD-MCNC: 12.7 G/DL (ref 13.5–17.5)
LDLC SERPL CALC-MCNC: 152 MG/DL (ref ?–100)
LIPASE SERPL-CCNC: 320 U/L (ref 73–393)
LYMPHOCYTES # BLD AUTO: 2.3 10^3/UL (ref 1.5–5)
LYMPHOCYTES NFR BLD AUTO: 35.2 % (ref 24–44)
MAGNESIUM SERPL-MCNC: 2.1 MG/DL (ref 1.8–2.4)
MCH RBC QN AUTO: 29.3 PG (ref 27–33)
MCHC RBC AUTO-ENTMCNC: 32.6 G/DL (ref 32–36.5)
MCV RBC AUTO: 89.6 FL (ref 80–96)
MONOCYTES # BLD AUTO: 0.6 10^3/UL (ref 0–0.8)
MONOCYTES NFR BLD AUTO: 8.9 % (ref 2–8)
NEUTROPHILS # BLD AUTO: 3.5 10^3/UL (ref 1.5–8.5)
NEUTROPHILS NFR BLD AUTO: 51.9 % (ref 36–66)
NONHDLC SERPL-MCNC: 223 MG/DL
PLATELET # BLD AUTO: 102 10^3/UL (ref 150–450)
POTASSIUM SERPL-SCNC: 4.3 MEQ/L (ref 3.5–5.1)
PROT SERPL-MCNC: 7.4 GM/DL (ref 6.4–8.2)
RBC # BLD AUTO: 4.34 10^6/UL (ref 4.3–6.1)
SODIUM SERPL-SCNC: 141 MEQ/L (ref 136–145)
TRIGL SERPL-MCNC: 354 MG/DL (ref ?–150)
WBC # BLD AUTO: 6.7 10^3/UL (ref 4–10)

## 2022-05-16 ENCOUNTER — HOSPITAL ENCOUNTER (OUTPATIENT)
Dept: HOSPITAL 53 - M SFHCCLAY | Age: 62
End: 2022-05-16
Attending: FAMILY MEDICINE
Payer: COMMERCIAL

## 2022-05-16 DIAGNOSIS — K74.00: Primary | ICD-10-CM

## 2022-05-16 DIAGNOSIS — K76.0: ICD-10-CM

## 2022-05-16 LAB
ALBUMIN SERPL BCG-MCNC: 3 GM/DL (ref 3.2–5.2)
ALT SERPL W P-5'-P-CCNC: 73 U/L (ref 12–78)
BILIRUB SERPL-MCNC: 1.5 MG/DL (ref 0.2–1)
BUN SERPL-MCNC: 23 MG/DL (ref 7–18)
CALCIUM SERPL-MCNC: 9.5 MG/DL (ref 8.8–10.2)
CHLORIDE SERPL-SCNC: 109 MEQ/L (ref 98–107)
CO2 SERPL-SCNC: 26 MEQ/L (ref 21–32)
CREAT SERPL-MCNC: 1.3 MG/DL (ref 0.7–1.3)
GFR SERPL CREATININE-BSD FRML MDRD: 59.5 ML/MIN/{1.73_M2} (ref 49–?)
GLUCOSE SERPL-MCNC: 94 MG/DL (ref 70–100)
POTASSIUM SERPL-SCNC: 5.1 MEQ/L (ref 3.5–5.1)
PROT SERPL-MCNC: 6.8 GM/DL (ref 6.4–8.2)
SODIUM SERPL-SCNC: 141 MEQ/L (ref 136–145)

## 2022-06-19 ENCOUNTER — HOSPITAL ENCOUNTER (INPATIENT)
Dept: HOSPITAL 53 - M ED | Age: 62
LOS: 1 days | Discharge: ANOTHER HOSPITAL | DRG: 661 | End: 2022-06-20
Attending: INTERNAL MEDICINE | Admitting: INTERNAL MEDICINE
Payer: COMMERCIAL

## 2022-06-19 VITALS — BODY MASS INDEX: 30.37 KG/M2 | HEIGHT: 71 IN | WEIGHT: 216.93 LBS

## 2022-06-19 VITALS — DIASTOLIC BLOOD PRESSURE: 69 MMHG | SYSTOLIC BLOOD PRESSURE: 127 MMHG

## 2022-06-19 VITALS — DIASTOLIC BLOOD PRESSURE: 57 MMHG | SYSTOLIC BLOOD PRESSURE: 99 MMHG

## 2022-06-19 VITALS — SYSTOLIC BLOOD PRESSURE: 113 MMHG | DIASTOLIC BLOOD PRESSURE: 60 MMHG

## 2022-06-19 VITALS — DIASTOLIC BLOOD PRESSURE: 58 MMHG | SYSTOLIC BLOOD PRESSURE: 95 MMHG

## 2022-06-19 VITALS — DIASTOLIC BLOOD PRESSURE: 73 MMHG | SYSTOLIC BLOOD PRESSURE: 120 MMHG

## 2022-06-19 VITALS — SYSTOLIC BLOOD PRESSURE: 115 MMHG | DIASTOLIC BLOOD PRESSURE: 57 MMHG

## 2022-06-19 VITALS — DIASTOLIC BLOOD PRESSURE: 59 MMHG | SYSTOLIC BLOOD PRESSURE: 113 MMHG

## 2022-06-19 VITALS — SYSTOLIC BLOOD PRESSURE: 109 MMHG | DIASTOLIC BLOOD PRESSURE: 57 MMHG

## 2022-06-19 VITALS — SYSTOLIC BLOOD PRESSURE: 111 MMHG | DIASTOLIC BLOOD PRESSURE: 56 MMHG

## 2022-06-19 VITALS — DIASTOLIC BLOOD PRESSURE: 59 MMHG | SYSTOLIC BLOOD PRESSURE: 114 MMHG

## 2022-06-19 VITALS — SYSTOLIC BLOOD PRESSURE: 127 MMHG | DIASTOLIC BLOOD PRESSURE: 59 MMHG

## 2022-06-19 VITALS — DIASTOLIC BLOOD PRESSURE: 58 MMHG | SYSTOLIC BLOOD PRESSURE: 99 MMHG

## 2022-06-19 VITALS — DIASTOLIC BLOOD PRESSURE: 58 MMHG | SYSTOLIC BLOOD PRESSURE: 117 MMHG

## 2022-06-19 VITALS — SYSTOLIC BLOOD PRESSURE: 113 MMHG | DIASTOLIC BLOOD PRESSURE: 57 MMHG

## 2022-06-19 VITALS — DIASTOLIC BLOOD PRESSURE: 54 MMHG | SYSTOLIC BLOOD PRESSURE: 121 MMHG

## 2022-06-19 VITALS — SYSTOLIC BLOOD PRESSURE: 113 MMHG | DIASTOLIC BLOOD PRESSURE: 51 MMHG

## 2022-06-19 DIAGNOSIS — I48.91: ICD-10-CM

## 2022-06-19 DIAGNOSIS — Z79.01: ICD-10-CM

## 2022-06-19 DIAGNOSIS — E11.9: ICD-10-CM

## 2022-06-19 DIAGNOSIS — D68.32: Primary | ICD-10-CM

## 2022-06-19 DIAGNOSIS — D62: ICD-10-CM

## 2022-06-19 DIAGNOSIS — Z88.8: ICD-10-CM

## 2022-06-19 DIAGNOSIS — I25.10: ICD-10-CM

## 2022-06-19 DIAGNOSIS — Z90.49: ICD-10-CM

## 2022-06-19 DIAGNOSIS — I48.92: ICD-10-CM

## 2022-06-19 DIAGNOSIS — K26.9: ICD-10-CM

## 2022-06-19 DIAGNOSIS — K92.2: ICD-10-CM

## 2022-06-19 DIAGNOSIS — Z96.641: ICD-10-CM

## 2022-06-19 DIAGNOSIS — I85.01: ICD-10-CM

## 2022-06-19 DIAGNOSIS — K86.1: ICD-10-CM

## 2022-06-19 DIAGNOSIS — I10: ICD-10-CM

## 2022-06-19 DIAGNOSIS — Z20.822: ICD-10-CM

## 2022-06-19 DIAGNOSIS — K74.60: ICD-10-CM

## 2022-06-19 DIAGNOSIS — Z95.5: ICD-10-CM

## 2022-06-19 DIAGNOSIS — E80.4: ICD-10-CM

## 2022-06-19 DIAGNOSIS — Z79.899: ICD-10-CM

## 2022-06-19 DIAGNOSIS — K75.81: ICD-10-CM

## 2022-06-19 DIAGNOSIS — K29.70: ICD-10-CM

## 2022-06-19 DIAGNOSIS — E78.5: ICD-10-CM

## 2022-06-19 LAB
ALBUMIN SERPL BCG-MCNC: 2.4 GM/DL (ref 3.2–5.2)
ALT SERPL W P-5'-P-CCNC: 52 U/L (ref 12–78)
APTT BLD: 33.2 SECONDS (ref 25.9–37)
BASOPHILS # BLD AUTO: 0 10^3/UL (ref 0–0.2)
BASOPHILS NFR BLD AUTO: 0.6 % (ref 0–1)
BILIRUB CONJ SERPL-MCNC: 0.4 MG/DL (ref 0–0.2)
BILIRUB SERPL-MCNC: 1.2 MG/DL (ref 0.2–1)
BUN SERPL-MCNC: 56 MG/DL (ref 7–18)
CALCIUM SERPL-MCNC: 9.2 MG/DL (ref 8.8–10.2)
CHLORIDE SERPL-SCNC: 110 MEQ/L (ref 98–107)
CO2 SERPL-SCNC: 24 MEQ/L (ref 21–32)
CREAT SERPL-MCNC: 1.27 MG/DL (ref 0.7–1.3)
EOSINOPHIL # BLD AUTO: 0.2 10^3/UL (ref 0–0.5)
EOSINOPHIL NFR BLD AUTO: 2.5 % (ref 0–3)
GFR SERPL CREATININE-BSD FRML MDRD: > 60 ML/MIN/{1.73_M2} (ref 49–?)
GLUCOSE SERPL-MCNC: 148 MG/DL (ref 70–100)
HCT VFR BLD AUTO: 29.8 % (ref 42–52)
HCT VFR BLD AUTO: 30.5 % (ref 42–52)
HCT VFR BLD AUTO: 30.9 % (ref 42–52)
HGB BLD-MCNC: 10.1 G/DL (ref 13.5–17.5)
HGB BLD-MCNC: 10.3 G/DL (ref 13.5–17.5)
HGB BLD-MCNC: 9.9 G/DL (ref 13.5–17.5)
INR PPP: 1.18
LYMPHOCYTES # BLD AUTO: 1.9 10^3/UL (ref 1.5–5)
LYMPHOCYTES NFR BLD AUTO: 29 % (ref 24–44)
MCH RBC QN AUTO: 30.3 PG (ref 27–33)
MCH RBC QN AUTO: 30.7 PG (ref 27–33)
MCHC RBC AUTO-ENTMCNC: 32.7 G/DL (ref 32–36.5)
MCHC RBC AUTO-ENTMCNC: 33.2 G/DL (ref 32–36.5)
MCV RBC AUTO: 91.1 FL (ref 80–96)
MCV RBC AUTO: 93.9 FL (ref 80–96)
MONOCYTES # BLD AUTO: 0.5 10^3/UL (ref 0–0.8)
MONOCYTES NFR BLD AUTO: 7.8 % (ref 2–8)
NEUTROPHILS # BLD AUTO: 4 10^3/UL (ref 1.5–8.5)
NEUTROPHILS NFR BLD AUTO: 59.8 % (ref 36–66)
PLATELET # BLD AUTO: 132 10^3/UL (ref 150–450)
PLATELET # BLD AUTO: 86 10^3/UL (ref 150–450)
POTASSIUM SERPL-SCNC: 5.1 MEQ/L (ref 3.5–5.1)
PROT SERPL-MCNC: 5.8 GM/DL (ref 6.4–8.2)
PROTHROMBIN TIME: 15.4 SECONDS (ref 12.7–14.5)
RBC # BLD AUTO: 3.27 10^6/UL (ref 4.3–6.1)
RBC # BLD AUTO: 3.29 10^6/UL (ref 4.3–6.1)
RSV RNA NPH QL NAA+PROBE: NEGATIVE
SODIUM SERPL-SCNC: 142 MEQ/L (ref 136–145)
TSH SERPL DL<=0.005 MIU/L-ACNC: 1.35 UIU/ML (ref 0.36–3.74)
WBC # BLD AUTO: 5.6 10^3/UL (ref 4–10)
WBC # BLD AUTO: 6.7 10^3/UL (ref 4–10)

## 2022-06-19 PROCEDURE — 30233N1 TRANSFUSION OF NONAUTOLOGOUS RED BLOOD CELLS INTO PERIPHERAL VEIN, PERCUTANEOUS APPROACH: ICD-10-PCS | Performed by: INTERNAL MEDICINE

## 2022-06-19 PROCEDURE — 0W3P8ZZ CONTROL BLEEDING IN GASTROINTESTINAL TRACT, VIA NATURAL OR ARTIFICIAL OPENING ENDOSCOPIC: ICD-10-PCS | Performed by: SURGERY

## 2022-06-19 RX ADMIN — DEXTROSE MONOHYDRATE SCH MLS/HR: 50 INJECTION, SOLUTION INTRAVENOUS at 14:24

## 2022-06-19 RX ADMIN — DEXTROSE AND SODIUM CHLORIDE SCH MLS/HR: 5; 900 INJECTION, SOLUTION INTRAVENOUS at 20:05

## 2022-06-19 RX ADMIN — DEXTROSE AND SODIUM CHLORIDE SCH MLS/HR: 5; 900 INJECTION, SOLUTION INTRAVENOUS at 16:44

## 2022-06-19 RX ADMIN — DEXTROSE MONOHYDRATE SCH MLS/HR: 50 INJECTION, SOLUTION INTRAVENOUS at 10:20

## 2022-06-19 RX ADMIN — DEXTROSE MONOHYDRATE SCH MLS/HR: 50 INJECTION, SOLUTION INTRAVENOUS at 19:24

## 2022-06-20 VITALS — DIASTOLIC BLOOD PRESSURE: 46 MMHG | SYSTOLIC BLOOD PRESSURE: 115 MMHG

## 2022-06-20 VITALS — DIASTOLIC BLOOD PRESSURE: 25 MMHG | SYSTOLIC BLOOD PRESSURE: 46 MMHG

## 2022-06-20 VITALS — SYSTOLIC BLOOD PRESSURE: 99 MMHG | DIASTOLIC BLOOD PRESSURE: 54 MMHG

## 2022-06-20 VITALS — SYSTOLIC BLOOD PRESSURE: 102 MMHG | DIASTOLIC BLOOD PRESSURE: 40 MMHG

## 2022-06-20 VITALS — SYSTOLIC BLOOD PRESSURE: 95 MMHG | DIASTOLIC BLOOD PRESSURE: 55 MMHG

## 2022-06-20 VITALS — DIASTOLIC BLOOD PRESSURE: 55 MMHG | SYSTOLIC BLOOD PRESSURE: 101 MMHG

## 2022-06-20 VITALS — DIASTOLIC BLOOD PRESSURE: 51 MMHG | SYSTOLIC BLOOD PRESSURE: 99 MMHG

## 2022-06-20 VITALS — DIASTOLIC BLOOD PRESSURE: 44 MMHG | SYSTOLIC BLOOD PRESSURE: 102 MMHG

## 2022-06-20 VITALS — SYSTOLIC BLOOD PRESSURE: 109 MMHG | DIASTOLIC BLOOD PRESSURE: 55 MMHG

## 2022-06-20 VITALS — SYSTOLIC BLOOD PRESSURE: 98 MMHG | DIASTOLIC BLOOD PRESSURE: 52 MMHG

## 2022-06-20 VITALS — DIASTOLIC BLOOD PRESSURE: 42 MMHG | SYSTOLIC BLOOD PRESSURE: 105 MMHG

## 2022-06-20 VITALS — DIASTOLIC BLOOD PRESSURE: 52 MMHG | SYSTOLIC BLOOD PRESSURE: 107 MMHG

## 2022-06-20 VITALS — SYSTOLIC BLOOD PRESSURE: 89 MMHG | DIASTOLIC BLOOD PRESSURE: 51 MMHG

## 2022-06-20 VITALS — SYSTOLIC BLOOD PRESSURE: 98 MMHG | DIASTOLIC BLOOD PRESSURE: 44 MMHG

## 2022-06-20 LAB
BASOPHILS # BLD AUTO: 0 10^3/UL (ref 0–0.2)
BASOPHILS NFR BLD AUTO: 0.5 % (ref 0–1)
BUN SERPL-MCNC: 42 MG/DL (ref 7–18)
CALCIUM SERPL-MCNC: 8.2 MG/DL (ref 8.8–10.2)
CHLORIDE SERPL-SCNC: 117 MEQ/L (ref 98–107)
CO2 SERPL-SCNC: 22 MEQ/L (ref 21–32)
CREAT SERPL-MCNC: 1.26 MG/DL (ref 0.7–1.3)
EOSINOPHIL # BLD AUTO: 0.2 10^3/UL (ref 0–0.5)
EOSINOPHIL NFR BLD AUTO: 2.6 % (ref 0–3)
GFR SERPL CREATININE-BSD FRML MDRD: > 60 ML/MIN/{1.73_M2} (ref 49–?)
GLUCOSE SERPL-MCNC: 179 MG/DL (ref 70–100)
HCT VFR BLD AUTO: 28.7 % (ref 42–52)
HGB BLD-MCNC: 9.8 G/DL (ref 13.5–17.5)
LYMPHOCYTES # BLD AUTO: 2.3 10^3/UL (ref 1.5–5)
LYMPHOCYTES NFR BLD AUTO: 36.8 % (ref 24–44)
MCH RBC QN AUTO: 31.2 PG (ref 27–33)
MCHC RBC AUTO-ENTMCNC: 34.1 G/DL (ref 32–36.5)
MCV RBC AUTO: 91.4 FL (ref 80–96)
MONOCYTES # BLD AUTO: 0.5 10^3/UL (ref 0–0.8)
MONOCYTES NFR BLD AUTO: 8.8 % (ref 2–8)
NEUTROPHILS # BLD AUTO: 3.2 10^3/UL (ref 1.5–8.5)
NEUTROPHILS NFR BLD AUTO: 51 % (ref 36–66)
PLATELET # BLD AUTO: 87 10^3/UL (ref 150–450)
POTASSIUM SERPL-SCNC: 4.1 MEQ/L (ref 3.5–5.1)
RBC # BLD AUTO: 3.14 10^6/UL (ref 4.3–6.1)
SODIUM SERPL-SCNC: 147 MEQ/L (ref 136–145)
WBC # BLD AUTO: 6.2 10^3/UL (ref 4–10)

## 2022-06-20 RX ADMIN — DEXTROSE AND SODIUM CHLORIDE SCH MLS/HR: 5; 900 INJECTION, SOLUTION INTRAVENOUS at 06:05

## 2022-06-20 RX ADMIN — DEXTROSE MONOHYDRATE SCH MLS/HR: 50 INJECTION, SOLUTION INTRAVENOUS at 01:02

## 2022-06-20 RX ADMIN — DEXTROSE MONOHYDRATE SCH MLS/HR: 50 INJECTION, SOLUTION INTRAVENOUS at 07:57

## 2022-06-28 ENCOUNTER — HOSPITAL ENCOUNTER (OUTPATIENT)
Dept: HOSPITAL 53 - M LAB | Age: 62
End: 2022-06-28
Attending: INTERNAL MEDICINE
Payer: COMMERCIAL

## 2022-06-28 DIAGNOSIS — I85.01: Primary | ICD-10-CM

## 2022-06-28 LAB
HCT VFR BLD AUTO: 29.4 % (ref 42–52)
HGB BLD-MCNC: 9.6 G/DL (ref 13.5–17.5)
MCH RBC QN AUTO: 30.7 PG (ref 27–33)
MCHC RBC AUTO-ENTMCNC: 32.7 G/DL (ref 32–36.5)
MCV RBC AUTO: 93.9 FL (ref 80–96)
PLATELET # BLD AUTO: 90 10^3/UL (ref 150–450)
RBC # BLD AUTO: 3.13 10^6/UL (ref 4.3–6.1)
WBC # BLD AUTO: 4.8 10^3/UL (ref 4–10)

## 2022-08-04 ENCOUNTER — HOSPITAL ENCOUNTER (OUTPATIENT)
Dept: HOSPITAL 53 - M LABSMTC | Age: 62
End: 2022-08-04
Payer: COMMERCIAL

## 2022-08-04 DIAGNOSIS — Z20.822: Primary | ICD-10-CM

## 2022-08-04 DIAGNOSIS — Z11.52: ICD-10-CM

## 2022-08-09 ENCOUNTER — HOSPITAL ENCOUNTER (OUTPATIENT)
Dept: HOSPITAL 53 - M PLALAB | Age: 62
End: 2022-08-09
Attending: NURSE PRACTITIONER
Payer: COMMERCIAL

## 2022-08-09 DIAGNOSIS — K74.60: Primary | ICD-10-CM

## 2022-08-09 DIAGNOSIS — I10: ICD-10-CM

## 2022-08-09 DIAGNOSIS — I48.20: ICD-10-CM

## 2022-08-09 LAB
ALBUMIN SERPL BCG-MCNC: 2.7 GM/DL (ref 3.2–5.2)
ALT SERPL W P-5'-P-CCNC: 57 U/L (ref 12–78)
APTT BLD: 34.5 SECONDS (ref 25.9–37)
BASOPHILS # BLD AUTO: 0 10^3/UL (ref 0–0.2)
BASOPHILS NFR BLD AUTO: 0.6 % (ref 0–1)
BILIRUB SERPL-MCNC: 1 MG/DL (ref 0.2–1)
BUN SERPL-MCNC: 24 MG/DL (ref 7–18)
CALCIUM SERPL-MCNC: 8.6 MG/DL (ref 8.8–10.2)
CHLORIDE SERPL-SCNC: 111 MEQ/L (ref 98–107)
CO2 SERPL-SCNC: 24 MEQ/L (ref 21–32)
CREAT SERPL-MCNC: 1.31 MG/DL (ref 0.7–1.3)
EOSINOPHIL # BLD AUTO: 0.1 10^3/UL (ref 0–0.5)
EOSINOPHIL NFR BLD AUTO: 2.5 % (ref 0–3)
EST. AVERAGE GLUCOSE BLD GHB EST-MCNC: 131 MG/DL (ref 60–110)
GFR SERPL CREATININE-BSD FRML MDRD: 59 ML/MIN/{1.73_M2} (ref 49–?)
GLUCOSE SERPL-MCNC: 220 MG/DL (ref 70–100)
HCT VFR BLD AUTO: 30.7 % (ref 42–52)
HGB BLD-MCNC: 9.3 G/DL (ref 13.5–17.5)
INR PPP: 1.09
LYMPHOCYTES # BLD AUTO: 1.4 10^3/UL (ref 1.5–5)
LYMPHOCYTES NFR BLD AUTO: 28.6 % (ref 24–44)
MCH RBC QN AUTO: 27 PG (ref 27–33)
MCHC RBC AUTO-ENTMCNC: 30.3 G/DL (ref 32–36.5)
MCV RBC AUTO: 89 FL (ref 80–96)
MONOCYTES # BLD AUTO: 0.4 10^3/UL (ref 0–0.8)
MONOCYTES NFR BLD AUTO: 8.8 % (ref 2–8)
NEUTROPHILS # BLD AUTO: 2.9 10^3/UL (ref 1.5–8.5)
NEUTROPHILS NFR BLD AUTO: 59.3 % (ref 36–66)
NT-PRO BNP: 216 PG/ML (ref ?–125)
PLATELET # BLD AUTO: 103 10^3/UL (ref 150–450)
POTASSIUM SERPL-SCNC: 4.5 MEQ/L (ref 3.5–5.1)
PROT SERPL-MCNC: 6.6 GM/DL (ref 6.4–8.2)
PROTHROMBIN TIME: 14.5 SECONDS (ref 12.7–14.5)
RBC # BLD AUTO: 3.45 10^6/UL (ref 4.3–6.1)
SODIUM SERPL-SCNC: 139 MEQ/L (ref 136–145)
T4 FREE SERPL-MCNC: 1.1 NG/DL (ref 0.76–1.46)
TSH SERPL DL<=0.005 MIU/L-ACNC: 1.18 UIU/ML (ref 0.36–3.74)
WBC # BLD AUTO: 4.9 10^3/UL (ref 4–10)

## 2022-08-16 ENCOUNTER — HOSPITAL ENCOUNTER (OUTPATIENT)
Dept: HOSPITAL 53 - M PLALAB | Age: 62
End: 2022-08-16
Attending: NURSE PRACTITIONER
Payer: COMMERCIAL

## 2022-08-16 DIAGNOSIS — E11.59: ICD-10-CM

## 2022-08-16 DIAGNOSIS — K74.60: Primary | ICD-10-CM

## 2022-08-16 DIAGNOSIS — D64.9: Primary | ICD-10-CM

## 2022-08-16 DIAGNOSIS — I25.10: ICD-10-CM

## 2022-08-16 LAB
ALBUMIN SERPL BCG-MCNC: 3 GM/DL (ref 3.2–5.2)
ALT SERPL W P-5'-P-CCNC: 44 U/L (ref 12–78)
BILIRUB SERPL-MCNC: 1.1 MG/DL (ref 0.2–1)
BUN SERPL-MCNC: 27 MG/DL (ref 7–18)
CALCIUM SERPL-MCNC: 9.6 MG/DL (ref 8.8–10.2)
CHLORIDE SERPL-SCNC: 110 MEQ/L (ref 98–107)
CO2 SERPL-SCNC: 27 MEQ/L (ref 21–32)
CREAT SERPL-MCNC: 1.46 MG/DL (ref 0.7–1.3)
GFR SERPL CREATININE-BSD FRML MDRD: 52.1 ML/MIN/{1.73_M2} (ref 49–?)
GLUCOSE SERPL-MCNC: 108 MG/DL (ref 70–100)
HCT VFR BLD AUTO: 29.8 % (ref 42–52)
HGB BLD-MCNC: 9.2 G/DL (ref 13.5–17.5)
IRON SATN MFR SERPL: 27.1 % (ref 19.7–50)
IRON SERPL-MCNC: 101 UG/DL (ref 65–175)
MCH RBC QN AUTO: 27.1 PG (ref 27–33)
MCHC RBC AUTO-ENTMCNC: 30.9 G/DL (ref 32–36.5)
MCV RBC AUTO: 87.6 FL (ref 80–96)
PLATELET # BLD AUTO: 95 10^3/UL (ref 150–450)
POTASSIUM SERPL-SCNC: 4.4 MEQ/L (ref 3.5–5.1)
PROT SERPL-MCNC: 6.9 GM/DL (ref 6.4–8.2)
RBC # BLD AUTO: 3.4 10^6/UL (ref 4.3–6.1)
SODIUM SERPL-SCNC: 139 MEQ/L (ref 136–145)
TIBC SERPL-MCNC: 373 UG/DL (ref 250–450)
WBC # BLD AUTO: 4.1 10^3/UL (ref 4–10)

## 2022-09-06 ENCOUNTER — HOSPITAL ENCOUNTER (EMERGENCY)
Dept: HOSPITAL 53 - M ED | Age: 62
LOS: 1 days | Discharge: HOME | End: 2022-09-07
Payer: COMMERCIAL

## 2022-09-06 VITALS — BODY MASS INDEX: 28.9 KG/M2 | WEIGHT: 206.44 LBS | HEIGHT: 71 IN

## 2022-09-06 DIAGNOSIS — K76.0: ICD-10-CM

## 2022-09-06 DIAGNOSIS — Z79.899: ICD-10-CM

## 2022-09-06 DIAGNOSIS — Z88.8: ICD-10-CM

## 2022-09-06 DIAGNOSIS — E80.4: ICD-10-CM

## 2022-09-06 DIAGNOSIS — H40.9: ICD-10-CM

## 2022-09-06 DIAGNOSIS — I10: ICD-10-CM

## 2022-09-06 DIAGNOSIS — Z95.5: ICD-10-CM

## 2022-09-06 DIAGNOSIS — U07.1: Primary | ICD-10-CM

## 2022-09-06 DIAGNOSIS — I25.10: ICD-10-CM

## 2022-09-06 LAB
ALBUMIN SERPL BCG-MCNC: 3.2 GM/DL (ref 3.2–5.2)
ALT SERPL W P-5'-P-CCNC: 69 U/L (ref 12–78)
AMYLASE SERPL-CCNC: 62 U/L (ref 25–115)
BASOPHILS # BLD AUTO: 0 10^3/UL (ref 0–0.2)
BASOPHILS NFR BLD AUTO: 0.8 % (ref 0–1)
BILIRUB CONJ SERPL-MCNC: 0.5 MG/DL (ref 0–0.2)
BILIRUB SERPL-MCNC: 1.6 MG/DL (ref 0.2–1)
BUN SERPL-MCNC: 23 MG/DL (ref 7–18)
CALCIUM SERPL-MCNC: 9.3 MG/DL (ref 8.8–10.2)
CHLORIDE SERPL-SCNC: 106 MEQ/L (ref 98–107)
CK MB CFR.DF SERPL CALC: 1.56
CK MB SERPL-MCNC: < 1 NG/ML (ref ?–3.6)
CK SERPL-CCNC: 64 U/L (ref 39–308)
CO2 SERPL-SCNC: 24 MEQ/L (ref 21–32)
CREAT SERPL-MCNC: 1.5 MG/DL (ref 0.7–1.3)
EOSINOPHIL # BLD AUTO: 0.2 10^3/UL (ref 0–0.5)
EOSINOPHIL NFR BLD AUTO: 4 % (ref 0–3)
GFR SERPL CREATININE-BSD FRML MDRD: 50.5 ML/MIN/{1.73_M2} (ref 49–?)
GLUCOSE SERPL-MCNC: 124 MG/DL (ref 70–100)
HCT VFR BLD AUTO: 32.3 % (ref 42–52)
HGB BLD-MCNC: 10.6 G/DL (ref 13.5–17.5)
INR PPP: 1.13
LIPASE SERPL-CCNC: 260 U/L (ref 73–393)
LYMPHOCYTES # BLD AUTO: 0.4 10^3/UL (ref 1.5–5)
LYMPHOCYTES NFR BLD AUTO: 9.4 % (ref 24–44)
MCH RBC QN AUTO: 29 PG (ref 27–33)
MCHC RBC AUTO-ENTMCNC: 32.8 G/DL (ref 32–36.5)
MCV RBC AUTO: 88.5 FL (ref 80–96)
MONOCYTES # BLD AUTO: 0.5 10^3/UL (ref 0–0.8)
MONOCYTES NFR BLD AUTO: 12.6 % (ref 2–8)
NEUTROPHILS # BLD AUTO: 2.7 10^3/UL (ref 1.5–8.5)
NEUTROPHILS NFR BLD AUTO: 72.7 % (ref 36–66)
PLATELET # BLD AUTO: 81 10^3/UL (ref 150–450)
POTASSIUM SERPL-SCNC: 3.9 MEQ/L (ref 3.5–5.1)
PROT SERPL-MCNC: 7.2 GM/DL (ref 6.4–8.2)
PROTHROMBIN TIME: 14.9 SECONDS (ref 12.7–14.5)
RBC # BLD AUTO: 3.65 10^6/UL (ref 4.3–6.1)
SODIUM SERPL-SCNC: 138 MEQ/L (ref 136–145)
WBC # BLD AUTO: 3.7 10^3/UL (ref 4–10)

## 2022-09-07 VITALS — DIASTOLIC BLOOD PRESSURE: 56 MMHG | SYSTOLIC BLOOD PRESSURE: 105 MMHG

## 2022-09-07 LAB
CK MB CFR.DF SERPL CALC: 1.96
CK MB SERPL-MCNC: 1.1 NG/ML (ref ?–3.6)
CK SERPL-CCNC: 56 U/L (ref 39–308)

## 2022-09-09 ENCOUNTER — HOSPITAL ENCOUNTER (EMERGENCY)
Dept: HOSPITAL 53 - M ED | Age: 62
LOS: 1 days | Discharge: HOME | End: 2022-09-10
Payer: COMMERCIAL

## 2022-09-09 VITALS — WEIGHT: 206.44 LBS | BODY MASS INDEX: 28.9 KG/M2 | HEIGHT: 71 IN

## 2022-09-09 VITALS — DIASTOLIC BLOOD PRESSURE: 56 MMHG | SYSTOLIC BLOOD PRESSURE: 112 MMHG

## 2022-09-09 DIAGNOSIS — Z79.899: ICD-10-CM

## 2022-09-09 DIAGNOSIS — Z95.5: ICD-10-CM

## 2022-09-09 DIAGNOSIS — E11.9: ICD-10-CM

## 2022-09-09 DIAGNOSIS — L03.116: ICD-10-CM

## 2022-09-09 DIAGNOSIS — E78.5: ICD-10-CM

## 2022-09-09 DIAGNOSIS — Z88.8: ICD-10-CM

## 2022-09-09 DIAGNOSIS — Z88.5: ICD-10-CM

## 2022-09-09 DIAGNOSIS — I11.9: ICD-10-CM

## 2022-09-09 DIAGNOSIS — U07.1: Primary | ICD-10-CM

## 2022-09-09 LAB
ALBUMIN SERPL BCG-MCNC: 2.9 GM/DL (ref 3.2–5.2)
ALT SERPL W P-5'-P-CCNC: 103 U/L (ref 12–78)
BASOPHILS # BLD AUTO: 0 10^3/UL (ref 0–0.2)
BASOPHILS NFR BLD AUTO: 0.2 % (ref 0–1)
BILIRUB CONJ SERPL-MCNC: 0.4 MG/DL (ref 0–0.2)
BILIRUB SERPL-MCNC: 1 MG/DL (ref 0.2–1)
BUN SERPL-MCNC: 23 MG/DL (ref 7–18)
CALCIUM SERPL-MCNC: 8.6 MG/DL (ref 8.8–10.2)
CHLORIDE SERPL-SCNC: 106 MEQ/L (ref 98–107)
CK MB CFR.DF SERPL CALC: 1.89
CK MB SERPL-MCNC: < 1 NG/ML (ref ?–3.6)
CK SERPL-CCNC: 53 U/L (ref 39–308)
CO2 SERPL-SCNC: 23 MEQ/L (ref 21–32)
CREAT SERPL-MCNC: 1.6 MG/DL (ref 0.7–1.3)
CRP SERPL-MCNC: 1.01 MG/DL (ref 0–0.3)
EOSINOPHIL # BLD AUTO: 0 10^3/UL (ref 0–0.5)
EOSINOPHIL NFR BLD AUTO: 0.5 % (ref 0–3)
ERYTHROCYTE [SEDIMENTATION RATE] IN BLOOD BY WESTERGREN METHOD: 39 MM/HR (ref 0–20)
GFR SERPL CREATININE-BSD FRML MDRD: 46.9 ML/MIN/{1.73_M2} (ref 49–?)
GLUCOSE SERPL-MCNC: 175 MG/DL (ref 70–100)
HCT VFR BLD AUTO: 37.7 % (ref 42–52)
HGB BLD-MCNC: 11.9 G/DL (ref 13.5–17.5)
LIPASE SERPL-CCNC: 405 U/L (ref 73–393)
LYMPHOCYTES # BLD AUTO: 0.9 10^3/UL (ref 1.5–5)
LYMPHOCYTES NFR BLD AUTO: 20 % (ref 24–44)
MCH RBC QN AUTO: 28.3 PG (ref 27–33)
MCHC RBC AUTO-ENTMCNC: 31.6 G/DL (ref 32–36.5)
MCV RBC AUTO: 89.8 FL (ref 80–96)
MONOCYTES # BLD AUTO: 0.3 10^3/UL (ref 0–0.8)
MONOCYTES NFR BLD AUTO: 6.2 % (ref 2–8)
NEUTROPHILS # BLD AUTO: 3.2 10^3/UL (ref 1.5–8.5)
NEUTROPHILS NFR BLD AUTO: 72.4 % (ref 36–66)
PLATELET # BLD AUTO: 66 10^3/UL (ref 150–450)
POTASSIUM SERPL-SCNC: 4.6 MEQ/L (ref 3.5–5.1)
PROT SERPL-MCNC: 7.3 GM/DL (ref 6.4–8.2)
RBC # BLD AUTO: 4.2 10^6/UL (ref 4.3–6.1)
SODIUM SERPL-SCNC: 133 MEQ/L (ref 136–145)
WBC # BLD AUTO: 4.4 10^3/UL (ref 4–10)

## 2022-09-09 PROCEDURE — 83690 ASSAY OF LIPASE: CPT

## 2022-09-09 PROCEDURE — 82550 ASSAY OF CK (CPK): CPT

## 2022-09-09 PROCEDURE — 85055 RETICULATED PLATELET ASSAY: CPT

## 2022-09-09 PROCEDURE — 85652 RBC SED RATE AUTOMATED: CPT

## 2022-09-09 PROCEDURE — 85025 COMPLETE CBC W/AUTO DIFF WBC: CPT

## 2022-09-09 PROCEDURE — 96361 HYDRATE IV INFUSION ADD-ON: CPT

## 2022-09-09 PROCEDURE — 99284 EMERGENCY DEPT VISIT MOD MDM: CPT

## 2022-09-09 PROCEDURE — 85049 AUTOMATED PLATELET COUNT: CPT

## 2022-09-09 PROCEDURE — 84484 ASSAY OF TROPONIN QUANT: CPT

## 2022-09-09 PROCEDURE — 96365 THER/PROPH/DIAG IV INF INIT: CPT

## 2022-09-09 PROCEDURE — 93005 ELECTROCARDIOGRAM TRACING: CPT

## 2022-09-09 PROCEDURE — 83605 ASSAY OF LACTIC ACID: CPT

## 2022-09-09 PROCEDURE — 80076 HEPATIC FUNCTION PANEL: CPT

## 2022-09-09 PROCEDURE — 80048 BASIC METABOLIC PNL TOTAL CA: CPT

## 2022-09-09 PROCEDURE — 82553 CREATINE MB FRACTION: CPT

## 2022-09-09 PROCEDURE — 71045 X-RAY EXAM CHEST 1 VIEW: CPT

## 2022-09-09 PROCEDURE — 86140 C-REACTIVE PROTEIN: CPT

## 2022-09-09 PROCEDURE — 87040 BLOOD CULTURE FOR BACTERIA: CPT

## 2022-09-09 PROCEDURE — 93971 EXTREMITY STUDY: CPT

## 2022-09-13 ENCOUNTER — HOSPITAL ENCOUNTER (OUTPATIENT)
Dept: HOSPITAL 53 - M RAD | Age: 62
End: 2022-09-13
Attending: FAMILY MEDICINE
Payer: COMMERCIAL

## 2022-09-13 DIAGNOSIS — J20.9: Primary | ICD-10-CM

## 2022-09-14 ENCOUNTER — HOSPITAL ENCOUNTER (OUTPATIENT)
Dept: HOSPITAL 53 - M PLALAB | Age: 62
End: 2022-09-14
Attending: NURSE PRACTITIONER
Payer: COMMERCIAL

## 2022-09-14 ENCOUNTER — HOSPITAL ENCOUNTER (OUTPATIENT)
Dept: HOSPITAL 53 - M PLALAB | Age: 62
End: 2022-09-14
Attending: FAMILY MEDICINE
Payer: COMMERCIAL

## 2022-09-14 DIAGNOSIS — L03.116: ICD-10-CM

## 2022-09-14 DIAGNOSIS — K74.60: Primary | ICD-10-CM

## 2022-09-14 DIAGNOSIS — L03.116: Primary | ICD-10-CM

## 2022-09-14 LAB
ALBUMIN SERPL BCG-MCNC: 2.7 GM/DL (ref 3.2–5.2)
ALT SERPL W P-5'-P-CCNC: 51 U/L (ref 12–78)
BASOPHILS # BLD AUTO: 0 10^3/UL (ref 0–0.2)
BASOPHILS NFR BLD AUTO: 0 % (ref 0–1)
BILIRUB SERPL-MCNC: 1.3 MG/DL (ref 0.2–1)
BUN SERPL-MCNC: 30 MG/DL (ref 7–18)
CALCIUM SERPL-MCNC: 8.5 MG/DL (ref 8.8–10.2)
CHLORIDE SERPL-SCNC: 106 MEQ/L (ref 98–107)
CO2 SERPL-SCNC: 24 MEQ/L (ref 21–32)
CREAT SERPL-MCNC: 1.53 MG/DL (ref 0.7–1.3)
CRP SERPL-MCNC: 2.05 MG/DL (ref 0–0.3)
EOSINOPHIL # BLD AUTO: 0 10^3/UL (ref 0–0.5)
EOSINOPHIL NFR BLD AUTO: 0.2 % (ref 0–3)
ERYTHROCYTE [SEDIMENTATION RATE] IN BLOOD BY WESTERGREN METHOD: 67 MM/HR (ref 0–20)
GFR SERPL CREATININE-BSD FRML MDRD: 49.3 ML/MIN/{1.73_M2} (ref 49–?)
GLUCOSE SERPL-MCNC: 291 MG/DL (ref 70–100)
HCT VFR BLD AUTO: 36.3 % (ref 42–52)
HGB BLD-MCNC: 11.2 G/DL (ref 13.5–17.5)
INR PPP: 1.04
LYMPHOCYTES # BLD AUTO: 1 10^3/UL (ref 1.5–5)
LYMPHOCYTES NFR BLD AUTO: 15 % (ref 24–44)
MCH RBC QN AUTO: 28.6 PG (ref 27–33)
MCHC RBC AUTO-ENTMCNC: 30.9 G/DL (ref 32–36.5)
MCV RBC AUTO: 92.6 FL (ref 80–96)
MONOCYTES # BLD AUTO: 0.4 10^3/UL (ref 0–0.8)
MONOCYTES NFR BLD AUTO: 6.2 % (ref 2–8)
NEUTROPHILS # BLD AUTO: 5 10^3/UL (ref 1.5–8.5)
NEUTROPHILS NFR BLD AUTO: 78.1 % (ref 36–66)
NT-PRO BNP: 409 PG/ML (ref ?–125)
PLATELET # BLD AUTO: 101 10^3/UL (ref 150–450)
POTASSIUM SERPL-SCNC: 4.9 MEQ/L (ref 3.5–5.1)
PROT SERPL-MCNC: 6.9 GM/DL (ref 6.4–8.2)
PROTHROMBIN TIME: 14 SECONDS (ref 12.7–14.5)
RBC # BLD AUTO: 3.92 10^6/UL (ref 4.3–6.1)
SODIUM SERPL-SCNC: 135 MEQ/L (ref 136–145)
WBC # BLD AUTO: 6.3 10^3/UL (ref 4–10)

## 2022-10-13 ENCOUNTER — HOSPITAL ENCOUNTER (OUTPATIENT)
Dept: HOSPITAL 53 - M PLALAB | Age: 62
End: 2022-10-13
Attending: FAMILY MEDICINE
Payer: COMMERCIAL

## 2022-10-13 DIAGNOSIS — E11.22: Primary | ICD-10-CM

## 2022-10-13 DIAGNOSIS — D50.9: ICD-10-CM

## 2022-10-13 DIAGNOSIS — Z79.01: ICD-10-CM

## 2022-10-13 DIAGNOSIS — K74.60: ICD-10-CM

## 2022-10-13 DIAGNOSIS — N18.9: ICD-10-CM

## 2022-10-13 LAB
ALBUMIN SERPL BCG-MCNC: 3 GM/DL (ref 3.2–5.2)
ALT SERPL W P-5'-P-CCNC: 43 U/L (ref 12–78)
BASOPHILS # BLD AUTO: 0.1 10^3/UL (ref 0–0.2)
BASOPHILS NFR BLD AUTO: 1 % (ref 0–1)
BILIRUB SERPL-MCNC: 1.5 MG/DL (ref 0.2–1)
BUN SERPL-MCNC: 23 MG/DL (ref 7–18)
CALCIUM SERPL-MCNC: 9.5 MG/DL (ref 8.8–10.2)
CHLORIDE SERPL-SCNC: 105 MEQ/L (ref 98–107)
CHOLEST SERPL-MCNC: 212 MG/DL (ref ?–200)
CHOLEST/HDLC SERPL: 3.72 {RATIO} (ref ?–5)
CO2 SERPL-SCNC: 27 MEQ/L (ref 21–32)
CREAT SERPL-MCNC: 1.28 MG/DL (ref 0.7–1.3)
EOSINOPHIL # BLD AUTO: 0.2 10^3/UL (ref 0–0.5)
EOSINOPHIL NFR BLD AUTO: 4.9 % (ref 0–3)
EST. AVERAGE GLUCOSE BLD GHB EST-MCNC: 151 MG/DL (ref 60–110)
FERRITIN SERPL-MCNC: 34 NG/ML (ref 26–388)
GFR SERPL CREATININE-BSD FRML MDRD: > 60 ML/MIN/{1.73_M2} (ref 49–?)
GLUCOSE SERPL-MCNC: 171 MG/DL (ref 70–100)
HCT VFR BLD AUTO: 38.9 % (ref 42–52)
HDLC SERPL-MCNC: 57 MG/DL (ref 40–?)
HGB BLD-MCNC: 12.5 G/DL (ref 13.5–17.5)
INR PPP: 1.03
IRON SATN MFR SERPL: 34.9 % (ref 19.7–50)
IRON SERPL-MCNC: 123 UG/DL (ref 65–175)
LDLC SERPL CALC-MCNC: 129 MG/DL (ref ?–100)
LYMPHOCYTES # BLD AUTO: 1.5 10^3/UL (ref 1.5–5)
LYMPHOCYTES NFR BLD AUTO: 29.8 % (ref 24–44)
MCH RBC QN AUTO: 29.6 PG (ref 27–33)
MCHC RBC AUTO-ENTMCNC: 32.1 G/DL (ref 32–36.5)
MCV RBC AUTO: 92.2 FL (ref 80–96)
MONOCYTES # BLD AUTO: 0.6 10^3/UL (ref 0–0.8)
MONOCYTES NFR BLD AUTO: 12.1 % (ref 2–8)
NEUTROPHILS # BLD AUTO: 2.5 10^3/UL (ref 1.5–8.5)
NEUTROPHILS NFR BLD AUTO: 51.8 % (ref 36–66)
NONHDLC SERPL-MCNC: 155 MG/DL
NT-PRO BNP: 280 PG/ML (ref ?–125)
PLATELET # BLD AUTO: 96 10^3/UL (ref 150–450)
POTASSIUM SERPL-SCNC: 3.8 MEQ/L (ref 3.5–5.1)
PROT SERPL-MCNC: 7.6 GM/DL (ref 6.4–8.2)
PROTHROMBIN TIME: 13.7 SECONDS (ref 12.5–14.5)
RBC # BLD AUTO: 4.22 10^6/UL (ref 4.3–6.1)
SODIUM SERPL-SCNC: 140 MEQ/L (ref 136–145)
T4 FREE SERPL-MCNC: 1.08 NG/DL (ref 0.76–1.46)
TIBC SERPL-MCNC: 352 UG/DL (ref 250–450)
TRIGL SERPL-MCNC: 132 MG/DL (ref ?–150)
TSH SERPL DL<=0.005 MIU/L-ACNC: 1.49 UIU/ML (ref 0.36–3.74)
WBC # BLD AUTO: 4.9 10^3/UL (ref 4–10)

## 2022-10-14 LAB — VIT B12 SERPL-MCNC: 843 PG/ML (ref 247–911)

## 2022-12-15 ENCOUNTER — HOSPITAL ENCOUNTER (OUTPATIENT)
Dept: HOSPITAL 53 - M PLALAB | Age: 62
End: 2022-12-15
Attending: FAMILY MEDICINE
Payer: COMMERCIAL

## 2022-12-15 ENCOUNTER — HOSPITAL ENCOUNTER (OUTPATIENT)
Dept: HOSPITAL 53 - M RAD | Age: 62
End: 2022-12-15
Payer: COMMERCIAL

## 2022-12-15 DIAGNOSIS — K74.60: ICD-10-CM

## 2022-12-15 DIAGNOSIS — E11.22: Primary | ICD-10-CM

## 2022-12-15 DIAGNOSIS — I12.9: ICD-10-CM

## 2022-12-15 DIAGNOSIS — K74.60: Primary | ICD-10-CM

## 2022-12-15 DIAGNOSIS — D50.9: ICD-10-CM

## 2022-12-15 DIAGNOSIS — K76.0: ICD-10-CM

## 2022-12-15 DIAGNOSIS — E78.2: ICD-10-CM

## 2022-12-15 LAB
ALBUMIN SERPL BCG-MCNC: 3.1 G/DL (ref 3.2–5.2)
ALP SERPL-CCNC: 133 U/L (ref 46–116)
ALT SERPL W P-5'-P-CCNC: 51 U/L (ref 7–40)
AST SERPL-CCNC: 62 U/L (ref ?–34)
BASOPHILS # BLD AUTO: 0 10^3/UL (ref 0–0.2)
BASOPHILS NFR BLD AUTO: 0.7 % (ref 0–1)
BILIRUB SERPL-MCNC: 1.6 MG/DL (ref 0.3–1.2)
BUN SERPL-MCNC: 28 MG/DL (ref 9–23)
CALCIUM SERPL-MCNC: 9.1 MG/DL (ref 8.3–10.6)
CHLORIDE SERPL-SCNC: 100 MMOL/L (ref 98–107)
CHOLEST SERPL-MCNC: 114 MG/DL (ref ?–200)
CHOLEST/HDLC SERPL: 4.36 {RATIO} (ref ?–5)
CO2 SERPL-SCNC: 27 MMOL/L (ref 20–31)
CREAT SERPL-MCNC: 1.23 MG/DL (ref 0.7–1.3)
EOSINOPHIL # BLD AUTO: 0.3 10^3/UL (ref 0–0.5)
EOSINOPHIL NFR BLD AUTO: 4.9 % (ref 0–3)
EST. AVERAGE GLUCOSE BLD GHB EST-MCNC: 344 MG/DL (ref 60–110)
FERRITIN SERPL-MCNC: 59.2 NG/ML (ref 10.5–307.3)
GFR SERPL CREATININE-BSD FRML MDRD: > 60 ML/MIN/{1.73_M2} (ref 49–?)
GLUCOSE SERPL-MCNC: 532 MG/DL (ref 74–106)
HCT VFR BLD AUTO: 39.2 % (ref 42–52)
HDLC SERPL-MCNC: 26.1 MG/DL (ref 40–?)
HGB BLD-MCNC: 13.8 G/DL (ref 13.5–17.5)
INR PPP: 1.16
IRON SATN MFR SERPL: 19.6 % (ref 19.7–50)
IRON SERPL-MCNC: 64 UG/DL (ref 65–175)
LDLC SERPL CALC-MCNC: 15.3 MG/DL (ref ?–100)
LYMPHOCYTES # BLD AUTO: 1.5 10^3/UL (ref 1.5–5)
LYMPHOCYTES NFR BLD AUTO: 27.6 % (ref 24–44)
MCH RBC QN AUTO: 31.5 PG (ref 27–33)
MCHC RBC AUTO-ENTMCNC: 35.2 G/DL (ref 32–36.5)
MCV RBC AUTO: 89.5 FL (ref 80–96)
MONOCYTES # BLD AUTO: 0.5 10^3/UL (ref 0–0.8)
MONOCYTES NFR BLD AUTO: 8.3 % (ref 2–8)
NEUTROPHILS # BLD AUTO: 3.2 10^3/UL (ref 1.5–8.5)
NEUTROPHILS NFR BLD AUTO: 58.3 % (ref 36–66)
NONHDLC SERPL-MCNC: 88 MG/DL
PLATELET # BLD AUTO: 80 10^3/UL (ref 150–450)
POTASSIUM SERPL-SCNC: 4.5 MMOL/L (ref 3.5–5.1)
PROT SERPL-MCNC: 7 G/DL (ref 5.7–8.2)
PROTHROMBIN TIME: 15 SECONDS (ref 12.5–14.5)
RBC # BLD AUTO: 4.38 10^6/UL (ref 4.3–6.1)
SODIUM SERPL-SCNC: 133 MMOL/L (ref 136–145)
TIBC SERPL-MCNC: 326 UG/DL (ref 250–425)
TRIGL SERPL-MCNC: 363 MG/DL (ref ?–150)
WBC # BLD AUTO: 5.5 10^3/UL (ref 4–10)

## 2022-12-27 ENCOUNTER — HOSPITAL ENCOUNTER (OUTPATIENT)
Dept: HOSPITAL 53 - M PLALAB | Age: 62
End: 2022-12-27
Attending: FAMILY MEDICINE
Payer: COMMERCIAL

## 2022-12-27 DIAGNOSIS — E11.22: Primary | ICD-10-CM

## 2022-12-27 DIAGNOSIS — K74.60: ICD-10-CM

## 2022-12-27 LAB
ALBUMIN SERPL BCG-MCNC: 3.1 G/DL (ref 3.2–5.2)
ALP SERPL-CCNC: 128 U/L (ref 46–116)
ALT SERPL W P-5'-P-CCNC: 50 U/L (ref 7–40)
AMYLASE SERPL-CCNC: 75 U/L (ref 30–118)
AST SERPL-CCNC: 65 U/L (ref ?–34)
BILIRUB SERPL-MCNC: 2.4 MG/DL (ref 0.3–1.2)
BUN SERPL-MCNC: 38 MG/DL (ref 9–23)
CALCIUM SERPL-MCNC: 9.8 MG/DL (ref 8.3–10.6)
CHLORIDE SERPL-SCNC: 101 MMOL/L (ref 98–107)
CO2 SERPL-SCNC: 28 MMOL/L (ref 20–31)
CREAT SERPL-MCNC: 1.46 MG/DL (ref 0.7–1.3)
GFR SERPL CREATININE-BSD FRML MDRD: 52.1 ML/MIN/{1.73_M2} (ref 49–?)
GLUCOSE SERPL-MCNC: 247 MG/DL (ref 74–106)
LIPASE SERPL-CCNC: 59 U/L (ref 12–53)
POTASSIUM SERPL-SCNC: 4.4 MMOL/L (ref 3.5–5.1)
PROT SERPL-MCNC: 7.3 G/DL (ref 5.7–8.2)
SODIUM SERPL-SCNC: 135 MMOL/L (ref 136–145)

## 2023-01-09 ENCOUNTER — HOSPITAL ENCOUNTER (OUTPATIENT)
Dept: HOSPITAL 53 - M LAB | Age: 63
End: 2023-01-09
Attending: FAMILY MEDICINE
Payer: COMMERCIAL

## 2023-01-09 DIAGNOSIS — E11.22: Primary | ICD-10-CM

## 2023-01-09 DIAGNOSIS — K74.60: ICD-10-CM

## 2023-01-09 LAB
ALBUMIN SERPL BCG-MCNC: 3.5 G/DL (ref 3.2–5.2)
ALP SERPL-CCNC: 137 U/L (ref 46–116)
ALT SERPL W P-5'-P-CCNC: 58 U/L (ref 7–40)
AMYLASE SERPL-CCNC: 90 U/L (ref 30–118)
AST SERPL-CCNC: 81 U/L (ref ?–34)
BILIRUB SERPL-MCNC: 3 MG/DL (ref 0.3–1.2)
BUN SERPL-MCNC: 39 MG/DL (ref 9–23)
CALCIUM SERPL-MCNC: 9.8 MG/DL (ref 8.3–10.6)
CHLORIDE SERPL-SCNC: 101 MMOL/L (ref 98–107)
CO2 SERPL-SCNC: 27 MMOL/L (ref 20–31)
CREAT SERPL-MCNC: 1.42 MG/DL (ref 0.7–1.3)
GFR SERPL CREATININE-BSD FRML MDRD: 53.8 ML/MIN/{1.73_M2} (ref 49–?)
GLUCOSE SERPL-MCNC: 153 MG/DL (ref 74–106)
LIPASE SERPL-CCNC: 76 U/L (ref 12–53)
POTASSIUM SERPL-SCNC: 4.7 MMOL/L (ref 3.5–5.1)
PROT SERPL-MCNC: 7.6 G/DL (ref 5.7–8.2)
SODIUM SERPL-SCNC: 136 MMOL/L (ref 136–145)

## 2023-01-31 ENCOUNTER — HOSPITAL ENCOUNTER (OUTPATIENT)
Dept: HOSPITAL 53 - M LABDRAWC | Age: 63
End: 2023-01-31
Payer: COMMERCIAL

## 2023-01-31 ENCOUNTER — HOSPITAL ENCOUNTER (OUTPATIENT)
Dept: HOSPITAL 53 - M LABDRAWC | Age: 63
End: 2023-01-31
Attending: FAMILY MEDICINE
Payer: COMMERCIAL

## 2023-01-31 DIAGNOSIS — K74.60: ICD-10-CM

## 2023-01-31 DIAGNOSIS — E11.22: Primary | ICD-10-CM

## 2023-01-31 DIAGNOSIS — K74.60: Primary | ICD-10-CM

## 2023-01-31 DIAGNOSIS — K75.81: ICD-10-CM

## 2023-01-31 DIAGNOSIS — N18.9: ICD-10-CM

## 2023-01-31 DIAGNOSIS — Z79.84: ICD-10-CM

## 2023-01-31 LAB
ALBUMIN SERPL BCG-MCNC: 3.1 G/DL (ref 3.2–5.2)
ALP SERPL-CCNC: 123 U/L (ref 46–116)
ALT SERPL W P-5'-P-CCNC: 59 U/L (ref 7–40)
AMYLASE SERPL-CCNC: 91 U/L (ref 30–118)
AST SERPL-CCNC: 77 U/L (ref ?–34)
BASOPHILS # BLD AUTO: 0 10^3/UL (ref 0–0.2)
BASOPHILS # BLD AUTO: 0.1 10^3/UL (ref 0–0.2)
BASOPHILS NFR BLD AUTO: 0.7 % (ref 0–1)
BASOPHILS NFR BLD AUTO: 0.8 % (ref 0–1)
BILIRUB SERPL-MCNC: 1.7 MG/DL (ref 0.3–1.2)
BUN SERPL-MCNC: 33 MG/DL (ref 9–23)
CALCIUM SERPL-MCNC: 9.1 MG/DL (ref 8.3–10.6)
CHLORIDE SERPL-SCNC: 107 MMOL/L (ref 98–107)
CO2 SERPL-SCNC: 26 MMOL/L (ref 20–31)
CREAT SERPL-MCNC: 1.53 MG/DL (ref 0.7–1.3)
EOSINOPHIL # BLD AUTO: 0.2 10^3/UL (ref 0–0.5)
EOSINOPHIL # BLD AUTO: 0.2 10^3/UL (ref 0–0.5)
EOSINOPHIL NFR BLD AUTO: 2.8 % (ref 0–3)
EOSINOPHIL NFR BLD AUTO: 2.9 % (ref 0–3)
EST. AVERAGE GLUCOSE BLD GHB EST-MCNC: 249 MG/DL (ref 60–110)
GFR SERPL CREATININE-BSD FRML MDRD: 49.3 ML/MIN/{1.73_M2} (ref 49–?)
GLUCOSE SERPL-MCNC: 155 MG/DL (ref 74–106)
HCT VFR BLD AUTO: 40.4 % (ref 42–52)
HCT VFR BLD AUTO: 40.5 % (ref 42–52)
HGB BLD-MCNC: 13.2 G/DL (ref 13.5–17.5)
HGB BLD-MCNC: 13.5 G/DL (ref 13.5–17.5)
INR PPP: 1.14
LIPASE SERPL-CCNC: 85 U/L (ref 12–53)
LYMPHOCYTES # BLD AUTO: 1.4 10^3/UL (ref 1.5–5)
LYMPHOCYTES # BLD AUTO: 1.5 10^3/UL (ref 1.5–5)
LYMPHOCYTES NFR BLD AUTO: 23.3 % (ref 24–44)
LYMPHOCYTES NFR BLD AUTO: 23.4 % (ref 24–44)
MCH RBC QN AUTO: 31.4 PG (ref 27–33)
MCH RBC QN AUTO: 32.1 PG (ref 27–33)
MCHC RBC AUTO-ENTMCNC: 32.7 G/DL (ref 32–36.5)
MCHC RBC AUTO-ENTMCNC: 33.3 G/DL (ref 32–36.5)
MCV RBC AUTO: 96.2 FL (ref 80–96)
MCV RBC AUTO: 96.2 FL (ref 80–96)
MONOCYTES # BLD AUTO: 0.7 10^3/UL (ref 0–0.8)
MONOCYTES # BLD AUTO: 0.7 10^3/UL (ref 0–0.8)
MONOCYTES NFR BLD AUTO: 10.9 % (ref 2–8)
MONOCYTES NFR BLD AUTO: 11.2 % (ref 2–8)
NEUTROPHILS # BLD AUTO: 3.8 10^3/UL (ref 1.5–8.5)
NEUTROPHILS # BLD AUTO: 3.9 10^3/UL (ref 1.5–8.5)
NEUTROPHILS NFR BLD AUTO: 61.5 % (ref 36–66)
NEUTROPHILS NFR BLD AUTO: 61.9 % (ref 36–66)
PLATELET # BLD AUTO: 100 10^3/UL (ref 150–450)
PLATELET # BLD AUTO: 104 10^3/UL (ref 150–450)
POTASSIUM SERPL-SCNC: 5.1 MMOL/L (ref 3.5–5.1)
PROT SERPL-MCNC: 7 G/DL (ref 5.7–8.2)
PROTHROMBIN TIME: 14.8 SECONDS (ref 12.5–14.5)
RBC # BLD AUTO: 4.2 10^6/UL (ref 4.3–6.1)
RBC # BLD AUTO: 4.21 10^6/UL (ref 4.3–6.1)
SODIUM SERPL-SCNC: 140 MMOL/L (ref 136–145)
WBC # BLD AUTO: 6.2 10^3/UL (ref 4–10)
WBC # BLD AUTO: 6.4 10^3/UL (ref 4–10)

## 2023-03-14 ENCOUNTER — HOSPITAL ENCOUNTER (OUTPATIENT)
Dept: HOSPITAL 53 - M LABDRAWC | Age: 63
End: 2023-03-14
Attending: FAMILY MEDICINE
Payer: COMMERCIAL

## 2023-03-14 ENCOUNTER — HOSPITAL ENCOUNTER (OUTPATIENT)
Dept: HOSPITAL 53 - M LABDRAWC | Age: 63
End: 2023-03-14
Payer: COMMERCIAL

## 2023-03-14 DIAGNOSIS — K74.60: Primary | ICD-10-CM

## 2023-03-14 DIAGNOSIS — E11.22: Primary | ICD-10-CM

## 2023-03-14 LAB
ALBUMIN SERPL BCG-MCNC: 3.2 G/DL (ref 3.2–5.2)
ALBUMIN SERPL BCG-MCNC: 3.2 G/DL (ref 3.2–5.2)
ALP SERPL-CCNC: 128 U/L (ref 46–116)
ALP SERPL-CCNC: 129 U/L (ref 46–116)
ALT SERPL W P-5'-P-CCNC: 44 U/L (ref 7–40)
ALT SERPL W P-5'-P-CCNC: 44 U/L (ref 7–40)
AST SERPL-CCNC: 67 U/L (ref ?–34)
AST SERPL-CCNC: 67 U/L (ref ?–34)
BASOPHILS # BLD AUTO: 0.1 10^3/UL (ref 0–0.2)
BASOPHILS # BLD AUTO: 0.1 10^3/UL (ref 0–0.2)
BASOPHILS NFR BLD AUTO: 0.8 % (ref 0–1)
BASOPHILS NFR BLD AUTO: 0.8 % (ref 0–1)
BILIRUB SERPL-MCNC: 1.7 MG/DL (ref 0.3–1.2)
BILIRUB SERPL-MCNC: 1.8 MG/DL (ref 0.3–1.2)
BUN SERPL-MCNC: 20 MG/DL (ref 9–23)
BUN SERPL-MCNC: 21 MG/DL (ref 9–23)
CALCIUM SERPL-MCNC: 9.2 MG/DL (ref 8.3–10.6)
CALCIUM SERPL-MCNC: 9.3 MG/DL (ref 8.3–10.6)
CHLORIDE SERPL-SCNC: 108 MMOL/L (ref 98–107)
CHLORIDE SERPL-SCNC: 109 MMOL/L (ref 98–107)
CO2 SERPL-SCNC: 25 MMOL/L (ref 20–31)
CO2 SERPL-SCNC: 25 MMOL/L (ref 20–31)
CREAT SERPL-MCNC: 1.24 MG/DL (ref 0.7–1.3)
CREAT SERPL-MCNC: 1.25 MG/DL (ref 0.7–1.3)
EOSINOPHIL # BLD AUTO: 0.3 10^3/UL (ref 0–0.5)
EOSINOPHIL # BLD AUTO: 0.3 10^3/UL (ref 0–0.5)
EOSINOPHIL NFR BLD AUTO: 4.4 % (ref 0–3)
EOSINOPHIL NFR BLD AUTO: 4.6 % (ref 0–3)
EST. AVERAGE GLUCOSE BLD GHB EST-MCNC: 169 MG/DL (ref 60–110)
GFR SERPL CREATININE-BSD FRML MDRD: > 60 ML/MIN/{1.73_M2} (ref 49–?)
GFR SERPL CREATININE-BSD FRML MDRD: > 60 ML/MIN/{1.73_M2} (ref 49–?)
GLUCOSE SERPL-MCNC: 134 MG/DL (ref 74–106)
GLUCOSE SERPL-MCNC: 135 MG/DL (ref 74–106)
HCT VFR BLD AUTO: 41.9 % (ref 42–52)
HCT VFR BLD AUTO: 41.9 % (ref 42–52)
HGB BLD-MCNC: 14 G/DL (ref 13.5–17.5)
HGB BLD-MCNC: 14 G/DL (ref 13.5–17.5)
INR PPP: 1.12
LYMPHOCYTES # BLD AUTO: 1.5 10^3/UL (ref 1.5–5)
LYMPHOCYTES # BLD AUTO: 1.5 10^3/UL (ref 1.5–5)
LYMPHOCYTES NFR BLD AUTO: 24.1 % (ref 24–44)
LYMPHOCYTES NFR BLD AUTO: 24.3 % (ref 24–44)
MCH RBC QN AUTO: 32.6 PG (ref 27–33)
MCH RBC QN AUTO: 32.7 PG (ref 27–33)
MCHC RBC AUTO-ENTMCNC: 33.4 G/DL (ref 32–36.5)
MCHC RBC AUTO-ENTMCNC: 33.4 G/DL (ref 32–36.5)
MCV RBC AUTO: 97.7 FL (ref 80–96)
MCV RBC AUTO: 97.9 FL (ref 80–96)
MONOCYTES # BLD AUTO: 0.5 10^3/UL (ref 0–0.8)
MONOCYTES # BLD AUTO: 0.5 10^3/UL (ref 0–0.8)
MONOCYTES NFR BLD AUTO: 7.8 % (ref 2–8)
MONOCYTES NFR BLD AUTO: 7.9 % (ref 2–8)
NEUTROPHILS # BLD AUTO: 3.9 10^3/UL (ref 1.5–8.5)
NEUTROPHILS # BLD AUTO: 3.9 10^3/UL (ref 1.5–8.5)
NEUTROPHILS NFR BLD AUTO: 62.1 % (ref 36–66)
NEUTROPHILS NFR BLD AUTO: 62.6 % (ref 36–66)
PLATELET # BLD AUTO: 108 10^3/UL (ref 150–450)
PLATELET # BLD AUTO: 113 10^3/UL (ref 150–450)
POTASSIUM SERPL-SCNC: 5.3 MMOL/L (ref 3.5–5.1)
POTASSIUM SERPL-SCNC: 5.3 MMOL/L (ref 3.5–5.1)
PROT SERPL-MCNC: 6.8 G/DL (ref 5.7–8.2)
PROT SERPL-MCNC: 6.9 G/DL (ref 5.7–8.2)
PROTHROMBIN TIME: 14.6 SECONDS (ref 12.5–14.5)
RBC # BLD AUTO: 4.28 10^6/UL (ref 4.3–6.1)
RBC # BLD AUTO: 4.29 10^6/UL (ref 4.3–6.1)
SODIUM SERPL-SCNC: 139 MMOL/L (ref 136–145)
SODIUM SERPL-SCNC: 139 MMOL/L (ref 136–145)
WBC # BLD AUTO: 6.2 10^3/UL (ref 4–10)
WBC # BLD AUTO: 6.3 10^3/UL (ref 4–10)

## 2023-04-03 ENCOUNTER — HOSPITAL ENCOUNTER (OUTPATIENT)
Dept: HOSPITAL 53 - M LAB REF | Age: 63
End: 2023-04-03
Attending: FAMILY MEDICINE
Payer: COMMERCIAL

## 2023-04-03 DIAGNOSIS — R19.7: Primary | ICD-10-CM

## 2023-05-16 ENCOUNTER — HOSPITAL ENCOUNTER (OUTPATIENT)
Dept: HOSPITAL 53 - M PLALAB | Age: 63
End: 2023-05-16
Attending: NURSE PRACTITIONER
Payer: COMMERCIAL

## 2023-05-16 DIAGNOSIS — K74.69: Primary | ICD-10-CM

## 2023-05-16 DIAGNOSIS — I48.92: ICD-10-CM

## 2023-05-16 LAB
ALBUMIN SERPL BCG-MCNC: 3.7 G/DL (ref 3.2–5.2)
ALP SERPL-CCNC: 130 U/L (ref 46–116)
ALT SERPL W P-5'-P-CCNC: 68 U/L (ref 7–40)
APTT BLD: 33.6 SECONDS (ref 24.8–34.2)
AST SERPL-CCNC: 80 U/L (ref ?–34)
BILIRUB SERPL-MCNC: 2.4 MG/DL (ref 0.3–1.2)
BUN SERPL-MCNC: 35 MG/DL (ref 9–23)
CALCIUM SERPL-MCNC: 10.1 MG/DL (ref 8.3–10.6)
CHLORIDE SERPL-SCNC: 105 MMOL/L (ref 98–107)
CO2 SERPL-SCNC: 24 MMOL/L (ref 20–31)
CREAT SERPL-MCNC: 1.49 MG/DL (ref 0.7–1.3)
GFR SERPL CREATININE-BSD FRML MDRD: 50.7 ML/MIN/{1.73_M2} (ref 49–?)
GLUCOSE SERPL-MCNC: 105 MG/DL (ref 74–106)
HCT VFR BLD AUTO: 42.3 % (ref 42–52)
HGB BLD-MCNC: 14.4 G/DL (ref 13.5–17.5)
INR PPP: 1.11
MCH RBC QN AUTO: 31.9 PG (ref 27–33)
MCHC RBC AUTO-ENTMCNC: 34 G/DL (ref 32–36.5)
MCV RBC AUTO: 93.8 FL (ref 80–96)
PLATELET # BLD AUTO: 110 10^3/UL (ref 150–450)
POTASSIUM SERPL-SCNC: 4.7 MMOL/L (ref 3.5–5.1)
PROT SERPL-MCNC: 7.4 G/DL (ref 5.7–8.2)
PROTHROMBIN TIME: 14.5 SECONDS (ref 12.5–14.5)
RBC # BLD AUTO: 4.51 10^6/UL (ref 4.3–6.1)
SODIUM SERPL-SCNC: 137 MMOL/L (ref 136–145)
WBC # BLD AUTO: 8.1 10^3/UL (ref 4–10)

## 2023-06-19 ENCOUNTER — HOSPITAL ENCOUNTER (OUTPATIENT)
Dept: HOSPITAL 53 - M PLALAB | Age: 63
End: 2023-06-19
Attending: INTERNAL MEDICINE
Payer: COMMERCIAL

## 2023-06-19 DIAGNOSIS — K74.60: Primary | ICD-10-CM

## 2023-06-19 LAB
INR PPP: 1.08
PROTHROMBIN TIME: 14.2 SECONDS (ref 12.5–14.5)

## 2023-06-22 ENCOUNTER — HOSPITAL ENCOUNTER (OUTPATIENT)
Dept: HOSPITAL 53 - M LABDRAWC | Age: 63
End: 2023-06-22
Attending: INTERNAL MEDICINE
Payer: COMMERCIAL

## 2023-06-22 ENCOUNTER — HOSPITAL ENCOUNTER (OUTPATIENT)
Dept: HOSPITAL 53 - M LABDRAWC | Age: 63
End: 2023-06-22
Attending: FAMILY MEDICINE
Payer: COMMERCIAL

## 2023-06-22 DIAGNOSIS — E11.22: Primary | ICD-10-CM

## 2023-06-22 DIAGNOSIS — K74.60: Primary | ICD-10-CM

## 2023-06-22 LAB
ALBUMIN SERPL BCG-MCNC: 3.5 G/DL (ref 3.2–5.2)
ALBUMIN SERPL BCG-MCNC: 3.5 G/DL (ref 3.2–5.2)
ALP SERPL-CCNC: 132 U/L (ref 46–116)
ALP SERPL-CCNC: 134 U/L (ref 46–116)
ALT SERPL W P-5'-P-CCNC: 68 U/L (ref 7–40)
ALT SERPL W P-5'-P-CCNC: 69 U/L (ref 7–40)
APTT BLD: 33.2 SECONDS (ref 24.8–34.2)
AST SERPL-CCNC: 80 U/L (ref ?–34)
AST SERPL-CCNC: 82 U/L (ref ?–34)
BASOPHILS # BLD AUTO: 0 10^3/UL (ref 0–0.2)
BASOPHILS # BLD AUTO: 0.1 10^3/UL (ref 0–0.2)
BASOPHILS NFR BLD AUTO: 0.6 % (ref 0–1)
BASOPHILS NFR BLD AUTO: 0.7 % (ref 0–1)
BILIRUB SERPL-MCNC: 2.2 MG/DL (ref 0.3–1.2)
BILIRUB SERPL-MCNC: 2.3 MG/DL (ref 0.3–1.2)
BUN SERPL-MCNC: 32 MG/DL (ref 9–23)
BUN SERPL-MCNC: 34 MG/DL (ref 9–23)
CALCIUM SERPL-MCNC: 10 MG/DL (ref 8.3–10.6)
CALCIUM SERPL-MCNC: 9.1 MG/DL (ref 8.3–10.6)
CHLORIDE SERPL-SCNC: 107 MMOL/L (ref 98–107)
CHLORIDE SERPL-SCNC: 109 MMOL/L (ref 98–107)
CHOLEST SERPL-MCNC: 145 MG/DL (ref ?–200)
CHOLEST/HDLC SERPL: 3.02 {RATIO} (ref ?–5)
CO2 SERPL-SCNC: 23 MMOL/L (ref 20–31)
CO2 SERPL-SCNC: 24 MMOL/L (ref 20–31)
CREAT SERPL-MCNC: 1.5 MG/DL (ref 0.7–1.3)
CREAT SERPL-MCNC: 1.52 MG/DL (ref 0.7–1.3)
EOSINOPHIL # BLD AUTO: 0.2 10^3/UL (ref 0–0.5)
EOSINOPHIL # BLD AUTO: 0.2 10^3/UL (ref 0–0.5)
EOSINOPHIL NFR BLD AUTO: 2.4 % (ref 0–3)
EOSINOPHIL NFR BLD AUTO: 2.5 % (ref 0–3)
EST. AVERAGE GLUCOSE BLD GHB EST-MCNC: 166 MG/DL (ref 60–110)
FIBRINOGEN PPP-MCNC: 335 MG/DL (ref 268–480)
GFR SERPL CREATININE-BSD FRML MDRD: 49.6 ML/MIN/{1.73_M2} (ref 49–?)
GFR SERPL CREATININE-BSD FRML MDRD: 50.3 ML/MIN/{1.73_M2} (ref 49–?)
GLUCOSE SERPL-MCNC: 122 MG/DL (ref 74–106)
GLUCOSE SERPL-MCNC: 126 MG/DL (ref 74–106)
HCT VFR BLD AUTO: 40.2 % (ref 42–52)
HCT VFR BLD AUTO: 40.4 % (ref 42–52)
HDLC SERPL-MCNC: 47.9 MG/DL (ref 40–?)
HGB BLD-MCNC: 13.9 G/DL (ref 13.5–17.5)
HGB BLD-MCNC: 13.9 G/DL (ref 13.5–17.5)
INR PPP: 1.16
LDLC SERPL CALC-MCNC: 72.3 MG/DL (ref ?–100)
LYMPHOCYTES # BLD AUTO: 1.5 10^3/UL (ref 1.5–5)
LYMPHOCYTES # BLD AUTO: 1.6 10^3/UL (ref 1.5–5)
LYMPHOCYTES NFR BLD AUTO: 21.5 % (ref 24–44)
LYMPHOCYTES NFR BLD AUTO: 22.6 % (ref 24–44)
MCH RBC QN AUTO: 32.6 PG (ref 27–33)
MCH RBC QN AUTO: 32.8 PG (ref 27–33)
MCHC RBC AUTO-ENTMCNC: 34.4 G/DL (ref 32–36.5)
MCHC RBC AUTO-ENTMCNC: 34.6 G/DL (ref 32–36.5)
MCV RBC AUTO: 94.8 FL (ref 80–96)
MCV RBC AUTO: 94.8 FL (ref 80–96)
MONOCYTES # BLD AUTO: 0.6 10^3/UL (ref 0–0.8)
MONOCYTES # BLD AUTO: 0.6 10^3/UL (ref 0–0.8)
MONOCYTES NFR BLD AUTO: 7.9 % (ref 2–8)
MONOCYTES NFR BLD AUTO: 8.3 % (ref 2–8)
NEUTROPHILS # BLD AUTO: 4.7 10^3/UL (ref 1.5–8.5)
NEUTROPHILS # BLD AUTO: 4.8 10^3/UL (ref 1.5–8.5)
NEUTROPHILS NFR BLD AUTO: 65.7 % (ref 36–66)
NEUTROPHILS NFR BLD AUTO: 67.2 % (ref 36–66)
NONHDLC SERPL-MCNC: 97.1 MG/DL
PLATELET # BLD AUTO: 100 10^3/UL (ref 150–450)
PLATELET # BLD AUTO: 111 10^3/UL (ref 150–450)
POTASSIUM SERPL-SCNC: 4.2 MMOL/L (ref 3.5–5.1)
POTASSIUM SERPL-SCNC: 4.2 MMOL/L (ref 3.5–5.1)
PROT SERPL-MCNC: 7.1 G/DL (ref 5.7–8.2)
PROT SERPL-MCNC: 7.2 G/DL (ref 5.7–8.2)
PROTHROMBIN TIME: 15 SECONDS (ref 12.5–14.5)
RBC # BLD AUTO: 4.24 10^6/UL (ref 4.3–6.1)
RBC # BLD AUTO: 4.26 10^6/UL (ref 4.3–6.1)
SODIUM SERPL-SCNC: 139 MMOL/L (ref 136–145)
SODIUM SERPL-SCNC: 140 MMOL/L (ref 136–145)
TRIGL SERPL-MCNC: 124 MG/DL (ref ?–150)
WBC # BLD AUTO: 7.1 10^3/UL (ref 4–10)
WBC # BLD AUTO: 7.1 10^3/UL (ref 4–10)

## 2023-08-26 ENCOUNTER — HOSPITAL ENCOUNTER (EMERGENCY)
Dept: HOSPITAL 53 - M ED | Age: 63
Discharge: HOME | End: 2023-08-26
Payer: COMMERCIAL

## 2023-08-26 VITALS — HEIGHT: 71 IN | BODY MASS INDEX: 30.93 KG/M2 | WEIGHT: 220.9 LBS

## 2023-08-26 VITALS — TEMPERATURE: 97.7 F | SYSTOLIC BLOOD PRESSURE: 102 MMHG | OXYGEN SATURATION: 97 % | DIASTOLIC BLOOD PRESSURE: 63 MMHG

## 2023-08-26 VITALS — SYSTOLIC BLOOD PRESSURE: 102 MMHG | DIASTOLIC BLOOD PRESSURE: 67 MMHG

## 2023-08-26 DIAGNOSIS — E11.9: ICD-10-CM

## 2023-08-26 DIAGNOSIS — I49.01: Primary | ICD-10-CM

## 2023-08-26 DIAGNOSIS — I51.9: ICD-10-CM

## 2023-08-26 DIAGNOSIS — Z88.5: ICD-10-CM

## 2023-08-26 DIAGNOSIS — Z79.899: ICD-10-CM

## 2023-08-26 DIAGNOSIS — Z88.8: ICD-10-CM

## 2023-08-26 DIAGNOSIS — I10: ICD-10-CM

## 2023-08-26 DIAGNOSIS — E78.5: ICD-10-CM

## 2023-08-26 DIAGNOSIS — I48.92: ICD-10-CM

## 2023-08-26 LAB
ALBUMIN SERPL BCG-MCNC: 2.9 G/DL (ref 3.2–5.2)
ALP SERPL-CCNC: 224 U/L (ref 46–116)
ALT SERPL W P-5'-P-CCNC: 65 U/L (ref 7–40)
APTT BLD: 35.4 SECONDS (ref 24.8–34.2)
AST SERPL-CCNC: 81 U/L (ref ?–34)
BASOPHILS # BLD AUTO: 0.1 10^3/UL (ref 0–0.2)
BASOPHILS NFR BLD AUTO: 0.7 % (ref 0–1)
BILIRUB CONJ SERPL-MCNC: 0.8 MG/DL (ref ?–0.4)
BILIRUB SERPL-MCNC: 2.1 MG/DL (ref 0.3–1.2)
BUN SERPL-MCNC: 28 MG/DL (ref 9–23)
CALCIUM SERPL-MCNC: 9 MG/DL (ref 8.3–10.6)
CHLORIDE SERPL-SCNC: 106 MMOL/L (ref 98–107)
CK MB CFR.DF SERPL CALC: 2.14
CK MB CFR.DF SERPL CALC: 2.44
CK MB SERPL-MCNC: 1.8 NG/ML (ref ?–3.6)
CK MB SERPL-MCNC: 2.2 NG/ML (ref ?–3.6)
CK SERPL-CCNC: 84 U/L (ref 46–171)
CK SERPL-CCNC: 90 U/L (ref 46–171)
CO2 SERPL-SCNC: 22 MMOL/L (ref 20–31)
CREAT SERPL-MCNC: 1.3 MG/DL (ref 0.7–1.3)
EOSINOPHIL # BLD AUTO: 0.2 10^3/UL (ref 0–0.5)
EOSINOPHIL NFR BLD AUTO: 2.4 % (ref 0–3)
GFR SERPL CREATININE-BSD FRML MDRD: 59.4 ML/MIN/{1.73_M2} (ref 49–?)
GLUCOSE SERPL-MCNC: 210 MG/DL (ref 74–106)
HCT VFR BLD AUTO: 44.5 % (ref 42–52)
HGB BLD-MCNC: 15.1 G/DL (ref 13.5–17.5)
INR PPP: 1.22
LIPASE SERPL-CCNC: 79 U/L (ref 12–53)
LYMPHOCYTES # BLD AUTO: 0.7 10^3/UL (ref 1.5–5)
LYMPHOCYTES NFR BLD AUTO: 10.3 % (ref 24–44)
MCH RBC QN AUTO: 32.5 PG (ref 27–33)
MCHC RBC AUTO-ENTMCNC: 33.9 G/DL (ref 32–36.5)
MCV RBC AUTO: 95.9 FL (ref 80–96)
MONOCYTES # BLD AUTO: 0.7 10^3/UL (ref 0–0.8)
MONOCYTES NFR BLD AUTO: 10.3 % (ref 2–8)
NEUTROPHILS # BLD AUTO: 5.3 10^3/UL (ref 1.5–8.5)
NEUTROPHILS NFR BLD AUTO: 75.9 % (ref 36–66)
PLATELET # BLD AUTO: 104 10^3/UL (ref 150–450)
POTASSIUM SERPL-SCNC: 4.8 MMOL/L (ref 3.5–5.1)
PROT SERPL-MCNC: 7.1 G/DL (ref 5.7–8.2)
PROTHROMBIN TIME: 15.1 SECONDS (ref 12.5–14.5)
RBC # BLD AUTO: 4.64 10^6/UL (ref 4.3–6.1)
SODIUM SERPL-SCNC: 138 MMOL/L (ref 136–145)
T4 FREE SERPL-MCNC: 1.01 NG/DL (ref 0.89–1.76)
TSH SERPL DL<=0.005 MIU/L-ACNC: 1.45 UIU/ML (ref 0.55–4.78)
WBC # BLD AUTO: 7 10^3/UL (ref 4–10)

## 2023-08-26 PROCEDURE — 84484 ASSAY OF TROPONIN QUANT: CPT

## 2023-08-26 PROCEDURE — 80048 BASIC METABOLIC PNL TOTAL CA: CPT

## 2023-08-26 PROCEDURE — 83880 ASSAY OF NATRIURETIC PEPTIDE: CPT

## 2023-08-26 PROCEDURE — 99285 EMERGENCY DEPT VISIT HI MDM: CPT

## 2023-08-26 PROCEDURE — 82550 ASSAY OF CK (CPK): CPT

## 2023-08-26 PROCEDURE — 85730 THROMBOPLASTIN TIME PARTIAL: CPT

## 2023-08-26 PROCEDURE — 94760 N-INVAS EAR/PLS OXIMETRY 1: CPT

## 2023-08-26 PROCEDURE — 83690 ASSAY OF LIPASE: CPT

## 2023-08-26 PROCEDURE — 85610 PROTHROMBIN TIME: CPT

## 2023-08-26 PROCEDURE — 85025 COMPLETE CBC W/AUTO DIFF WBC: CPT

## 2023-08-26 PROCEDURE — 84439 ASSAY OF FREE THYROXINE: CPT

## 2023-08-26 PROCEDURE — 71045 X-RAY EXAM CHEST 1 VIEW: CPT

## 2023-08-26 PROCEDURE — 96376 TX/PRO/DX INJ SAME DRUG ADON: CPT

## 2023-08-26 PROCEDURE — 84443 ASSAY THYROID STIM HORMONE: CPT

## 2023-08-26 PROCEDURE — 96374 THER/PROPH/DIAG INJ IV PUSH: CPT

## 2023-08-26 PROCEDURE — 96375 TX/PRO/DX INJ NEW DRUG ADDON: CPT

## 2023-08-26 PROCEDURE — 80076 HEPATIC FUNCTION PANEL: CPT

## 2023-08-26 PROCEDURE — 82553 CREATINE MB FRACTION: CPT

## 2023-08-26 PROCEDURE — 93041 RHYTHM ECG TRACING: CPT

## 2023-08-26 PROCEDURE — 93005 ELECTROCARDIOGRAM TRACING: CPT

## 2023-08-26 RX ADMIN — METOPROLOL TARTRATE SCH MG: 5 INJECTION INTRAVENOUS at 13:03

## 2023-08-26 RX ADMIN — METOPROLOL TARTRATE SCH MG: 5 INJECTION INTRAVENOUS at 13:19

## 2023-08-26 RX ADMIN — METOPROLOL TARTRATE SCH MG: 5 INJECTION INTRAVENOUS at 13:12

## 2023-08-27 ENCOUNTER — HOSPITAL ENCOUNTER (EMERGENCY)
Dept: HOSPITAL 53 - M ED | Age: 63
LOS: 1 days | Discharge: HOME | End: 2023-08-28
Payer: COMMERCIAL

## 2023-08-27 VITALS — HEIGHT: 71 IN | BODY MASS INDEX: 30.86 KG/M2 | WEIGHT: 220.46 LBS

## 2023-08-27 DIAGNOSIS — Z88.5: ICD-10-CM

## 2023-08-27 DIAGNOSIS — I10: ICD-10-CM

## 2023-08-27 DIAGNOSIS — Z79.899: ICD-10-CM

## 2023-08-27 DIAGNOSIS — E11.9: ICD-10-CM

## 2023-08-27 DIAGNOSIS — Z79.4: ICD-10-CM

## 2023-08-27 DIAGNOSIS — I44.4: ICD-10-CM

## 2023-08-27 DIAGNOSIS — E78.5: ICD-10-CM

## 2023-08-27 DIAGNOSIS — Z88.8: ICD-10-CM

## 2023-08-27 DIAGNOSIS — I48.3: Primary | ICD-10-CM

## 2023-08-27 LAB
BASOPHILS # BLD AUTO: 0 10^3/UL (ref 0–0.2)
BASOPHILS NFR BLD AUTO: 0.4 % (ref 0–1)
EOSINOPHIL # BLD AUTO: 0.2 10^3/UL (ref 0–0.5)
EOSINOPHIL NFR BLD AUTO: 2.8 % (ref 0–3)
HCT VFR BLD AUTO: 42.9 % (ref 42–52)
HGB BLD-MCNC: 14.6 G/DL (ref 13.5–17.5)
LYMPHOCYTES # BLD AUTO: 0.9 10^3/UL (ref 1.5–5)
LYMPHOCYTES NFR BLD AUTO: 12.4 % (ref 24–44)
MCH RBC QN AUTO: 33 PG (ref 27–33)
MCHC RBC AUTO-ENTMCNC: 34 G/DL (ref 32–36.5)
MCV RBC AUTO: 97.1 FL (ref 80–96)
MONOCYTES # BLD AUTO: 0.7 10^3/UL (ref 0–0.8)
MONOCYTES NFR BLD AUTO: 10.2 % (ref 2–8)
NEUTROPHILS # BLD AUTO: 5.2 10^3/UL (ref 1.5–8.5)
NEUTROPHILS NFR BLD AUTO: 73.6 % (ref 36–66)
PLATELET # BLD AUTO: 109 10^3/UL (ref 150–450)
RBC # BLD AUTO: 4.42 10^6/UL (ref 4.3–6.1)
WBC # BLD AUTO: 7 10^3/UL (ref 4–10)

## 2023-08-27 PROCEDURE — 84484 ASSAY OF TROPONIN QUANT: CPT

## 2023-08-27 PROCEDURE — 82553 CREATINE MB FRACTION: CPT

## 2023-08-27 PROCEDURE — 80048 BASIC METABOLIC PNL TOTAL CA: CPT

## 2023-08-27 PROCEDURE — 71045 X-RAY EXAM CHEST 1 VIEW: CPT

## 2023-08-27 PROCEDURE — 93005 ELECTROCARDIOGRAM TRACING: CPT

## 2023-08-27 PROCEDURE — 96365 THER/PROPH/DIAG IV INF INIT: CPT

## 2023-08-27 PROCEDURE — 99285 EMERGENCY DEPT VISIT HI MDM: CPT

## 2023-08-27 PROCEDURE — 96366 THER/PROPH/DIAG IV INF ADDON: CPT

## 2023-08-27 PROCEDURE — 85025 COMPLETE CBC W/AUTO DIFF WBC: CPT

## 2023-08-27 PROCEDURE — 82550 ASSAY OF CK (CPK): CPT

## 2023-08-28 VITALS — DIASTOLIC BLOOD PRESSURE: 65 MMHG | OXYGEN SATURATION: 98 % | SYSTOLIC BLOOD PRESSURE: 130 MMHG

## 2023-08-28 VITALS — OXYGEN SATURATION: 97 %

## 2023-08-28 VITALS — TEMPERATURE: 98.9 F

## 2023-08-28 LAB
BUN SERPL-MCNC: 30 MG/DL (ref 9–23)
CALCIUM SERPL-MCNC: 8.9 MG/DL (ref 8.3–10.6)
CHLORIDE SERPL-SCNC: 103 MMOL/L (ref 98–107)
CK MB CFR.DF SERPL CALC: 1.33
CK MB CFR.DF SERPL CALC: 1.55
CK MB SERPL-MCNC: 1.4 NG/ML (ref ?–3.6)
CK MB SERPL-MCNC: 1.7 NG/ML (ref ?–3.6)
CK SERPL-CCNC: 105 U/L (ref 46–171)
CK SERPL-CCNC: 109 U/L (ref 46–171)
CO2 SERPL-SCNC: 25 MMOL/L (ref 20–31)
CREAT SERPL-MCNC: 1.77 MG/DL (ref 0.7–1.3)
GFR SERPL CREATININE-BSD FRML MDRD: 41.6 ML/MIN/{1.73_M2} (ref 49–?)
GLUCOSE SERPL-MCNC: 306 MG/DL (ref 74–106)
POTASSIUM SERPL-SCNC: 4.7 MMOL/L (ref 3.5–5.1)
SODIUM SERPL-SCNC: 135 MMOL/L (ref 136–145)

## 2023-08-31 ENCOUNTER — HOSPITAL ENCOUNTER (OUTPATIENT)
Dept: HOSPITAL 53 - M LABDRAWC | Age: 63
End: 2023-08-31
Attending: INTERNAL MEDICINE
Payer: COMMERCIAL

## 2023-08-31 DIAGNOSIS — K74.60: Primary | ICD-10-CM

## 2023-08-31 LAB
ALBUMIN SERPL BCG-MCNC: 3.2 G/DL (ref 3.2–5.2)
ALP SERPL-CCNC: 224 U/L (ref 46–116)
ALT SERPL W P-5'-P-CCNC: 70 U/L (ref 7–40)
APTT BLD: 34.9 SECONDS (ref 24.8–34.2)
AST SERPL-CCNC: 92 U/L (ref ?–34)
BASOPHILS # BLD AUTO: 0 10^3/UL (ref 0–0.2)
BASOPHILS NFR BLD AUTO: 0.7 % (ref 0–1)
BILIRUB SERPL-MCNC: 2.5 MG/DL (ref 0.3–1.2)
BUN SERPL-MCNC: 32 MG/DL (ref 9–23)
CALCIUM SERPL-MCNC: 9.6 MG/DL (ref 8.3–10.6)
CHLORIDE SERPL-SCNC: 106 MMOL/L (ref 98–107)
CO2 SERPL-SCNC: 26 MMOL/L (ref 20–31)
CREAT SERPL-MCNC: 1.58 MG/DL (ref 0.7–1.3)
EOSINOPHIL # BLD AUTO: 0.2 10^3/UL (ref 0–0.5)
EOSINOPHIL NFR BLD AUTO: 2.6 % (ref 0–3)
FIBRINOGEN PPP-MCNC: 399 MG/DL (ref 268–480)
GFR SERPL CREATININE-BSD FRML MDRD: 47.4 ML/MIN/{1.73_M2} (ref 49–?)
GLUCOSE SERPL-MCNC: 131 MG/DL (ref 74–106)
HCT VFR BLD AUTO: 42.1 % (ref 42–52)
HGB BLD-MCNC: 14.2 G/DL (ref 13.5–17.5)
INR PPP: 1.3
LYMPHOCYTES # BLD AUTO: 0.6 10^3/UL (ref 1.5–5)
LYMPHOCYTES NFR BLD AUTO: 10 % (ref 24–44)
MCH RBC QN AUTO: 32.5 PG (ref 27–33)
MCHC RBC AUTO-ENTMCNC: 33.7 G/DL (ref 32–36.5)
MCV RBC AUTO: 96.3 FL (ref 80–96)
MONOCYTES # BLD AUTO: 0.6 10^3/UL (ref 0–0.8)
MONOCYTES NFR BLD AUTO: 10.4 % (ref 2–8)
NEUTROPHILS # BLD AUTO: 4.6 10^3/UL (ref 1.5–8.5)
NEUTROPHILS NFR BLD AUTO: 76 % (ref 36–66)
PLATELET # BLD AUTO: 85 10^3/UL (ref 150–450)
POTASSIUM SERPL-SCNC: 5.3 MMOL/L (ref 3.5–5.1)
PROT SERPL-MCNC: 7.2 G/DL (ref 5.7–8.2)
PROTHROMBIN TIME: 15.8 SECONDS (ref 12.5–14.5)
RBC # BLD AUTO: 4.37 10^6/UL (ref 4.3–6.1)
SODIUM SERPL-SCNC: 140 MMOL/L (ref 136–145)
WBC # BLD AUTO: 6.1 10^3/UL (ref 4–10)

## 2023-09-14 ENCOUNTER — HOSPITAL ENCOUNTER (OUTPATIENT)
Dept: HOSPITAL 53 - M RAD | Age: 63
End: 2023-09-14
Attending: INTERNAL MEDICINE
Payer: COMMERCIAL

## 2023-09-14 DIAGNOSIS — C22.0: ICD-10-CM

## 2023-09-14 DIAGNOSIS — K74.60: ICD-10-CM

## 2023-09-14 DIAGNOSIS — Z01.818: Primary | ICD-10-CM

## 2023-09-14 DIAGNOSIS — I65.23: ICD-10-CM

## 2023-11-20 ENCOUNTER — HOSPITAL ENCOUNTER (OUTPATIENT)
Dept: HOSPITAL 53 - M LABDRAWC | Age: 63
End: 2023-11-20
Attending: FAMILY MEDICINE
Payer: COMMERCIAL

## 2023-11-20 DIAGNOSIS — C22.0: Primary | ICD-10-CM

## 2023-11-20 DIAGNOSIS — Z76.82: ICD-10-CM

## 2023-11-20 DIAGNOSIS — E11.22: Primary | ICD-10-CM

## 2023-11-20 LAB
ALBUMIN SERPL BCG-MCNC: 3 G/DL (ref 3.2–5.2)
ALP SERPL-CCNC: 234 U/L (ref 46–116)
ALT SERPL W P-5'-P-CCNC: 95 U/L (ref 7–40)
APTT BLD: 44.3 SECONDS (ref 24.8–34.2)
AST SERPL-CCNC: 122 U/L (ref ?–34)
BASOPHILS # BLD AUTO: 0 10^3/UL (ref 0–0.2)
BASOPHILS NFR BLD AUTO: 0.6 % (ref 0–1)
BILIRUB SERPL-MCNC: 2.1 MG/DL (ref 0.3–1.2)
BUN SERPL-MCNC: 39 MG/DL (ref 9–23)
BUN SERPL-MCNC: 39 MG/DL (ref 9–23)
CALCIUM SERPL-MCNC: 8.8 MG/DL (ref 8.3–10.6)
CALCIUM SERPL-MCNC: 8.8 MG/DL (ref 8.3–10.6)
CHLORIDE SERPL-SCNC: 105 MMOL/L (ref 98–107)
CHLORIDE SERPL-SCNC: 105 MMOL/L (ref 98–107)
CO2 SERPL-SCNC: 24 MMOL/L (ref 20–31)
CO2 SERPL-SCNC: 25 MMOL/L (ref 20–31)
CREAT SERPL-MCNC: 1.82 MG/DL (ref 0.7–1.3)
CREAT SERPL-MCNC: 1.83 MG/DL (ref 0.7–1.3)
EOSINOPHIL # BLD AUTO: 0.2 10^3/UL (ref 0–0.5)
EOSINOPHIL NFR BLD AUTO: 2.8 % (ref 0–3)
FIBRINOGEN PPP-MCNC: 291 MG/DL (ref 268–480)
GFR SERPL CREATININE-BSD FRML MDRD: 40 ML/MIN/{1.73_M2} (ref 49–?)
GFR SERPL CREATININE-BSD FRML MDRD: 40.3 ML/MIN/{1.73_M2} (ref 49–?)
GLUCOSE SERPL-MCNC: 190 MG/DL (ref 74–106)
GLUCOSE SERPL-MCNC: 190 MG/DL (ref 74–106)
HCT VFR BLD AUTO: 37.7 % (ref 42–52)
HGB BLD-MCNC: 12.6 G/DL (ref 13.5–17.5)
INR PPP: 1.48
LYMPHOCYTES # BLD AUTO: 0.6 10^3/UL (ref 1.5–5)
LYMPHOCYTES NFR BLD AUTO: 10.6 % (ref 24–44)
MCH RBC QN AUTO: 33.2 PG (ref 27–33)
MCHC RBC AUTO-ENTMCNC: 33.4 G/DL (ref 32–36.5)
MCV RBC AUTO: 99.2 FL (ref 80–96)
MONOCYTES # BLD AUTO: 0.5 10^3/UL (ref 0–0.8)
MONOCYTES NFR BLD AUTO: 9.5 % (ref 2–8)
NEUTROPHILS # BLD AUTO: 4.1 10^3/UL (ref 1.5–8.5)
NEUTROPHILS NFR BLD AUTO: 76.3 % (ref 36–66)
PLATELET # BLD AUTO: 105 10^3/UL (ref 150–450)
POTASSIUM SERPL-SCNC: 5.6 MMOL/L (ref 3.5–5.1)
POTASSIUM SERPL-SCNC: 5.6 MMOL/L (ref 3.5–5.1)
PROT SERPL-MCNC: 7.2 G/DL (ref 5.7–8.2)
PROTHROMBIN TIME: 17.5 SECONDS (ref 12.5–14.5)
RBC # BLD AUTO: 3.8 10^6/UL (ref 4.3–6.1)
SODIUM SERPL-SCNC: 138 MMOL/L (ref 136–145)
SODIUM SERPL-SCNC: 138 MMOL/L (ref 136–145)
WBC # BLD AUTO: 5.4 10^3/UL (ref 4–10)

## 2024-01-09 ENCOUNTER — HOSPITAL ENCOUNTER (EMERGENCY)
Dept: HOSPITAL 53 - M ED | Age: 64
LOS: 1 days | Discharge: ANOTHER HOSPITAL | End: 2024-01-10
Payer: COMMERCIAL

## 2024-01-09 VITALS — TEMPERATURE: 97.5 F | DIASTOLIC BLOOD PRESSURE: 79 MMHG | SYSTOLIC BLOOD PRESSURE: 134 MMHG | OXYGEN SATURATION: 97 %

## 2024-01-09 VITALS — OXYGEN SATURATION: 98 % | SYSTOLIC BLOOD PRESSURE: 131 MMHG | TEMPERATURE: 98.2 F | DIASTOLIC BLOOD PRESSURE: 62 MMHG

## 2024-01-09 VITALS — SYSTOLIC BLOOD PRESSURE: 144 MMHG | DIASTOLIC BLOOD PRESSURE: 71 MMHG | TEMPERATURE: 98.2 F | OXYGEN SATURATION: 100 %

## 2024-01-09 VITALS — WEIGHT: 222.89 LBS | BODY MASS INDEX: 31.2 KG/M2 | HEIGHT: 71 IN

## 2024-01-09 VITALS — SYSTOLIC BLOOD PRESSURE: 123 MMHG | DIASTOLIC BLOOD PRESSURE: 61 MMHG | OXYGEN SATURATION: 99 % | TEMPERATURE: 97.8 F

## 2024-01-09 DIAGNOSIS — E78.5: ICD-10-CM

## 2024-01-09 DIAGNOSIS — K92.2: Primary | ICD-10-CM

## 2024-01-09 DIAGNOSIS — I10: ICD-10-CM

## 2024-01-09 DIAGNOSIS — Z88.5: ICD-10-CM

## 2024-01-09 DIAGNOSIS — Z79.4: ICD-10-CM

## 2024-01-09 DIAGNOSIS — Z79.01: ICD-10-CM

## 2024-01-09 DIAGNOSIS — Z79.899: ICD-10-CM

## 2024-01-09 DIAGNOSIS — Z79.82: ICD-10-CM

## 2024-01-09 DIAGNOSIS — Z88.8: ICD-10-CM

## 2024-01-09 DIAGNOSIS — E11.9: ICD-10-CM

## 2024-01-09 LAB
ALBUMIN SERPL BCG-MCNC: 3.1 G/DL (ref 3.2–5.2)
ALP SERPL-CCNC: 201 U/L (ref 46–116)
ALT SERPL W P-5'-P-CCNC: 80 U/L (ref 7–40)
APTT BLD: 37 SECONDS (ref 24.8–34.2)
AST SERPL-CCNC: 104 U/L (ref ?–34)
BASOPHILS # BLD AUTO: 0 10^3/UL (ref 0–0.2)
BASOPHILS NFR BLD AUTO: 0.4 % (ref 0–1)
BILIRUB CONJ SERPL-MCNC: 0.8 MG/DL (ref ?–0.4)
BILIRUB SERPL-MCNC: 2.3 MG/DL (ref 0.3–1.2)
BUN SERPL-MCNC: 32 MG/DL (ref 9–23)
CALCIUM SERPL-MCNC: 8.8 MG/DL (ref 8.3–10.6)
CHLORIDE SERPL-SCNC: 108 MMOL/L (ref 98–107)
CK MB CFR.DF SERPL CALC: 1.07
CK MB SERPL-MCNC: 1.2 NG/ML (ref ?–3.6)
CK SERPL-CCNC: 112 U/L (ref 46–171)
CO2 SERPL-SCNC: 24 MMOL/L (ref 20–31)
CREAT SERPL-MCNC: 1.43 MG/DL (ref 0.7–1.3)
EOSINOPHIL # BLD AUTO: 0.1 10^3/UL (ref 0–0.5)
EOSINOPHIL NFR BLD AUTO: 1.5 % (ref 0–3)
GFR SERPL CREATININE-BSD FRML MDRD: 53.2 ML/MIN/{1.73_M2} (ref 49–?)
GLUCOSE SERPL-MCNC: 250 MG/DL (ref 74–106)
HCT VFR BLD AUTO: 31.8 % (ref 42–52)
HCT VFR BLD AUTO: 36.7 % (ref 42–52)
HGB BLD-MCNC: 10.6 G/DL (ref 13.5–17.5)
HGB BLD-MCNC: 12.1 G/DL (ref 13.5–17.5)
INR PPP: 1.28
LIPASE SERPL-CCNC: 64 U/L (ref 12–53)
LYMPHOCYTES # BLD AUTO: 0.9 10^3/UL (ref 1.5–5)
LYMPHOCYTES NFR BLD AUTO: 12.2 % (ref 24–44)
MCH RBC QN AUTO: 31.2 PG (ref 27–33)
MCHC RBC AUTO-ENTMCNC: 33 G/DL (ref 32–36.5)
MCV RBC AUTO: 94.6 FL (ref 80–96)
MONOCYTES # BLD AUTO: 0.7 10^3/UL (ref 0–0.8)
MONOCYTES NFR BLD AUTO: 9.3 % (ref 2–8)
NEUTROPHILS # BLD AUTO: 5.6 10^3/UL (ref 1.5–8.5)
NEUTROPHILS NFR BLD AUTO: 76.2 % (ref 36–66)
PLATELET # BLD AUTO: 111 10^3/UL (ref 150–450)
POTASSIUM SERPL-SCNC: 5.2 MMOL/L (ref 3.5–5.1)
PROT SERPL-MCNC: 7.3 G/DL (ref 5.7–8.2)
PROTHROMBIN TIME: 15.6 SECONDS (ref 12.5–14.5)
RBC # BLD AUTO: 3.88 10^6/UL (ref 4.3–6.1)
SODIUM SERPL-SCNC: 137 MMOL/L (ref 136–145)
WBC # BLD AUTO: 7.3 10^3/UL (ref 4–10)

## 2024-01-09 PROCEDURE — 86850 RBC ANTIBODY SCREEN: CPT

## 2024-01-09 PROCEDURE — 93041 RHYTHM ECG TRACING: CPT

## 2024-01-09 PROCEDURE — 85018 HEMOGLOBIN: CPT

## 2024-01-09 PROCEDURE — 86901 BLOOD TYPING SEROLOGIC RH(D): CPT

## 2024-01-09 PROCEDURE — 82550 ASSAY OF CK (CPK): CPT

## 2024-01-09 PROCEDURE — 94760 N-INVAS EAR/PLS OXIMETRY 1: CPT

## 2024-01-09 PROCEDURE — 83735 ASSAY OF MAGNESIUM: CPT

## 2024-01-09 PROCEDURE — 93005 ELECTROCARDIOGRAM TRACING: CPT

## 2024-01-09 PROCEDURE — 80076 HEPATIC FUNCTION PANEL: CPT

## 2024-01-09 PROCEDURE — 99285 EMERGENCY DEPT VISIT HI MDM: CPT

## 2024-01-09 PROCEDURE — 86920 COMPATIBILITY TEST SPIN: CPT

## 2024-01-09 PROCEDURE — 96365 THER/PROPH/DIAG IV INF INIT: CPT

## 2024-01-09 PROCEDURE — 83690 ASSAY OF LIPASE: CPT

## 2024-01-09 PROCEDURE — 85025 COMPLETE CBC W/AUTO DIFF WBC: CPT

## 2024-01-09 PROCEDURE — 96375 TX/PRO/DX INJ NEW DRUG ADDON: CPT

## 2024-01-09 PROCEDURE — 36430 TRANSFUSION BLD/BLD COMPNT: CPT

## 2024-01-09 PROCEDURE — 85730 THROMBOPLASTIN TIME PARTIAL: CPT

## 2024-01-09 PROCEDURE — 85014 HEMATOCRIT: CPT

## 2024-01-09 PROCEDURE — 86900 BLOOD TYPING SEROLOGIC ABO: CPT

## 2024-01-09 PROCEDURE — 85610 PROTHROMBIN TIME: CPT

## 2024-01-09 PROCEDURE — 80048 BASIC METABOLIC PNL TOTAL CA: CPT

## 2024-01-09 PROCEDURE — 86870 RBC ANTIBODY IDENTIFICATION: CPT

## 2024-01-09 PROCEDURE — 82553 CREATINE MB FRACTION: CPT

## 2024-01-09 PROCEDURE — 71045 X-RAY EXAM CHEST 1 VIEW: CPT

## 2024-01-09 PROCEDURE — 84484 ASSAY OF TROPONIN QUANT: CPT

## 2024-01-09 PROCEDURE — 96366 THER/PROPH/DIAG IV INF ADDON: CPT

## 2024-01-09 PROCEDURE — 87635 SARS-COV-2 COVID-19 AMP PRB: CPT

## 2024-01-09 RX ADMIN — SODIUM CHLORIDE SCH MLS/HR: 9 INJECTION, SOLUTION INTRAVENOUS at 13:00

## 2024-01-09 RX ADMIN — DEXTROSE MONOHYDRATE SCH MLS/HR: 50 INJECTION, SOLUTION INTRAVENOUS at 18:14

## 2024-01-09 RX ADMIN — DEXTROSE MONOHYDRATE SCH MLS/HR: 50 INJECTION, SOLUTION INTRAVENOUS at 22:39

## 2024-01-09 RX ADMIN — DEXTROSE MONOHYDRATE SCH MLS/HR: 50 INJECTION, SOLUTION INTRAVENOUS at 13:00

## 2024-01-10 VITALS — DIASTOLIC BLOOD PRESSURE: 69 MMHG | SYSTOLIC BLOOD PRESSURE: 149 MMHG

## 2024-01-10 VITALS — OXYGEN SATURATION: 98 % | TEMPERATURE: 97.7 F | SYSTOLIC BLOOD PRESSURE: 154 MMHG | DIASTOLIC BLOOD PRESSURE: 77 MMHG

## 2024-01-10 LAB
ALBUMIN SERPL BCG-MCNC: 2.9 G/DL (ref 3.2–5.2)
ALP SERPL-CCNC: 189 U/L (ref 46–116)
ALT SERPL W P-5'-P-CCNC: 73 U/L (ref 7–40)
AST SERPL-CCNC: 100 U/L (ref ?–34)
BASOPHILS # BLD AUTO: 0 10^3/UL (ref 0–0.2)
BASOPHILS NFR BLD AUTO: 0.7 % (ref 0–1)
BILIRUB CONJ SERPL-MCNC: 1.1 MG/DL (ref ?–0.4)
BILIRUB SERPL-MCNC: 3.3 MG/DL (ref 0.3–1.2)
BUN SERPL-MCNC: 30 MG/DL (ref 9–23)
CALCIUM SERPL-MCNC: 8.9 MG/DL (ref 8.3–10.6)
CHLORIDE SERPL-SCNC: 109 MMOL/L (ref 98–107)
CO2 SERPL-SCNC: 24 MMOL/L (ref 20–31)
CREAT SERPL-MCNC: 1.42 MG/DL (ref 0.7–1.3)
EOSINOPHIL # BLD AUTO: 0.1 10^3/UL (ref 0–0.5)
EOSINOPHIL NFR BLD AUTO: 2.4 % (ref 0–3)
GFR SERPL CREATININE-BSD FRML MDRD: 53.6 ML/MIN/{1.73_M2} (ref 49–?)
GLUCOSE SERPL-MCNC: 125 MG/DL (ref 74–106)
HCT VFR BLD AUTO: 39.7 % (ref 42–52)
HGB BLD-MCNC: 12.9 G/DL (ref 13.5–17.5)
LYMPHOCYTES # BLD AUTO: 1 10^3/UL (ref 1.5–5)
LYMPHOCYTES NFR BLD AUTO: 17 % (ref 24–44)
MAGNESIUM SERPL-MCNC: 2 MG/DL (ref 1.8–2.4)
MCH RBC QN AUTO: 31 PG (ref 27–33)
MCHC RBC AUTO-ENTMCNC: 32.5 G/DL (ref 32–36.5)
MCV RBC AUTO: 95.4 FL (ref 80–96)
MONOCYTES # BLD AUTO: 0.5 10^3/UL (ref 0–0.8)
MONOCYTES NFR BLD AUTO: 9.1 % (ref 2–8)
NEUTROPHILS # BLD AUTO: 4.2 10^3/UL (ref 1.5–8.5)
NEUTROPHILS NFR BLD AUTO: 70.5 % (ref 36–66)
PLATELET # BLD AUTO: 105 10^3/UL (ref 150–450)
POTASSIUM SERPL-SCNC: 4.4 MMOL/L (ref 3.5–5.1)
PROT SERPL-MCNC: 6.9 G/DL (ref 5.7–8.2)
RBC # BLD AUTO: 4.16 10^6/UL (ref 4.3–6.1)
SODIUM SERPL-SCNC: 139 MMOL/L (ref 136–145)
WBC # BLD AUTO: 5.9 10^3/UL (ref 4–10)

## 2024-01-10 RX ADMIN — LACTULOSE SCH ML: 10 SOLUTION ORAL at 00:00

## 2024-01-10 RX ADMIN — SODIUM CHLORIDE SCH MLS/HR: 9 INJECTION, SOLUTION INTRAVENOUS at 08:20

## 2024-01-10 RX ADMIN — LACTULOSE SCH ML: 10 SOLUTION ORAL at 06:00

## 2024-01-10 RX ADMIN — DEXTROSE MONOHYDRATE SCH MLS/HR: 50 INJECTION, SOLUTION INTRAVENOUS at 06:44

## 2024-01-10 RX ADMIN — SODIUM CHLORIDE SCH MLS/HR: 9 INJECTION, SOLUTION INTRAVENOUS at 00:35

## 2024-01-10 RX ADMIN — DEXTROSE MONOHYDRATE SCH MLS/HR: 50 INJECTION, SOLUTION INTRAVENOUS at 03:20

## 2024-01-10 RX ADMIN — DEXTROSE MONOHYDRATE SCH MLS/HR: 50 INJECTION, SOLUTION INTRAVENOUS at 11:19

## 2024-01-10 RX ADMIN — INSULIN LISPRO SCH UNITS: 100 INJECTION, SOLUTION INTRAVENOUS; SUBCUTANEOUS at 00:00

## 2024-01-10 RX ADMIN — INSULIN LISPRO SCH UNITS: 100 INJECTION, SOLUTION INTRAVENOUS; SUBCUTANEOUS at 06:00

## 2024-01-19 ENCOUNTER — HOSPITAL ENCOUNTER (OUTPATIENT)
Dept: HOSPITAL 53 - M ED | Age: 64
Setting detail: OBSERVATION
LOS: 2 days | Discharge: HOME | End: 2024-01-21
Attending: INTERNAL MEDICINE | Admitting: INTERNAL MEDICINE
Payer: COMMERCIAL

## 2024-01-19 VITALS — OXYGEN SATURATION: 99 % | SYSTOLIC BLOOD PRESSURE: 144 MMHG | DIASTOLIC BLOOD PRESSURE: 72 MMHG | TEMPERATURE: 97.6 F

## 2024-01-19 VITALS — WEIGHT: 219.36 LBS | HEIGHT: 71 IN | BODY MASS INDEX: 30.71 KG/M2

## 2024-01-19 DIAGNOSIS — Z79.01: ICD-10-CM

## 2024-01-19 DIAGNOSIS — Z88.8: ICD-10-CM

## 2024-01-19 DIAGNOSIS — E11.9: ICD-10-CM

## 2024-01-19 DIAGNOSIS — Z85.05: ICD-10-CM

## 2024-01-19 DIAGNOSIS — Z79.82: ICD-10-CM

## 2024-01-19 DIAGNOSIS — Z79.4: ICD-10-CM

## 2024-01-19 DIAGNOSIS — I48.91: ICD-10-CM

## 2024-01-19 DIAGNOSIS — Z79.899: ICD-10-CM

## 2024-01-19 DIAGNOSIS — E72.20: Primary | ICD-10-CM

## 2024-01-19 DIAGNOSIS — K85.90: ICD-10-CM

## 2024-01-19 DIAGNOSIS — N17.9: ICD-10-CM

## 2024-01-19 DIAGNOSIS — I25.10: ICD-10-CM

## 2024-01-19 DIAGNOSIS — I48.92: ICD-10-CM

## 2024-01-19 DIAGNOSIS — N18.30: ICD-10-CM

## 2024-01-19 DIAGNOSIS — Z88.5: ICD-10-CM

## 2024-01-19 LAB
ALBUMIN SERPL BCG-MCNC: 3.1 G/DL (ref 3.2–5.2)
ALP SERPL-CCNC: 199 U/L (ref 46–116)
ALT SERPL W P-5'-P-CCNC: 70 U/L (ref 7–40)
AST SERPL-CCNC: 79 U/L (ref ?–34)
BASOPHILS # BLD AUTO: 0 10^3/UL (ref 0–0.2)
BASOPHILS NFR BLD AUTO: 0.4 % (ref 0–1)
BILIRUB CONJ SERPL-MCNC: 0.9 MG/DL (ref ?–0.4)
BILIRUB SERPL-MCNC: 2.3 MG/DL (ref 0.3–1.2)
BUN SERPL-MCNC: 34 MG/DL (ref 9–23)
CALCIUM SERPL-MCNC: 8.8 MG/DL (ref 8.3–10.6)
CHLORIDE SERPL-SCNC: 109 MMOL/L (ref 98–107)
CK MB CFR.DF SERPL CALC: 1.85
CK MB CFR.DF SERPL CALC: 2.56
CK MB SERPL-MCNC: < 1 NG/ML (ref ?–3.6)
CK MB SERPL-MCNC: < 1 NG/ML (ref ?–3.6)
CK SERPL-CCNC: 39 U/L (ref 46–171)
CK SERPL-CCNC: 54 U/L (ref 46–171)
CO2 SERPL-SCNC: 26 MMOL/L (ref 20–31)
CREAT SERPL-MCNC: 1.78 MG/DL (ref 0.7–1.3)
CRP SERPL-MCNC: < 0.4 MG/DL (ref ?–1)
EOSINOPHIL # BLD AUTO: 0.1 10^3/UL (ref 0–0.5)
EOSINOPHIL NFR BLD AUTO: 1.4 % (ref 0–3)
ETHANOL SERPL-MCNC: < 0.003 % (ref 0–0.01)
GFR SERPL CREATININE-BSD FRML MDRD: 41.3 ML/MIN/{1.73_M2} (ref 49–?)
GLUCOSE SERPL-MCNC: 199 MG/DL (ref 74–106)
HCT VFR BLD AUTO: 36.1 % (ref 42–52)
HGB BLD-MCNC: 12 G/DL (ref 13.5–17.5)
INR PPP: 1.35
LYMPHOCYTES # BLD AUTO: 0.6 10^3/UL (ref 1.5–5)
LYMPHOCYTES NFR BLD AUTO: 10 % (ref 24–44)
MAGNESIUM SERPL-MCNC: 2.2 MG/DL (ref 1.8–2.4)
MCH RBC QN AUTO: 31.1 PG (ref 27–33)
MCHC RBC AUTO-ENTMCNC: 33.2 G/DL (ref 32–36.5)
MCV RBC AUTO: 93.5 FL (ref 80–96)
MONOCYTES # BLD AUTO: 0.4 10^3/UL (ref 0–0.8)
MONOCYTES NFR BLD AUTO: 6.2 % (ref 2–8)
NEUTROPHILS # BLD AUTO: 4.7 10^3/UL (ref 1.5–8.5)
NEUTROPHILS NFR BLD AUTO: 81.8 % (ref 36–66)
PLATELET # BLD AUTO: 89 10^3/UL (ref 150–450)
POTASSIUM SERPL-SCNC: 4.8 MMOL/L (ref 3.5–5.1)
PROT SERPL-MCNC: 6.8 G/DL (ref 5.7–8.2)
PROTHROMBIN TIME: 16.2 SECONDS (ref 12.5–14.5)
RBC # BLD AUTO: 3.86 10^6/UL (ref 4.3–6.1)
SALICYLATES SERPL-MCNC: < 3 MG/DL (ref ?–30)
SODIUM SERPL-SCNC: 139 MMOL/L (ref 136–145)
TSH SERPL DL<=0.005 MIU/L-ACNC: 1.52 UIU/ML (ref 0.55–4.78)
WBC # BLD AUTO: 5.7 10^3/UL (ref 4–10)

## 2024-01-19 PROCEDURE — 96374 THER/PROPH/DIAG INJ IV PUSH: CPT

## 2024-01-19 PROCEDURE — 84484 ASSAY OF TROPONIN QUANT: CPT

## 2024-01-19 PROCEDURE — 86140 C-REACTIVE PROTEIN: CPT

## 2024-01-19 PROCEDURE — 85055 RETICULATED PLATELET ASSAY: CPT

## 2024-01-19 PROCEDURE — 87633 RESP VIRUS 12-25 TARGETS: CPT

## 2024-01-19 PROCEDURE — 96361 HYDRATE IV INFUSION ADD-ON: CPT

## 2024-01-19 PROCEDURE — 80048 BASIC METABOLIC PNL TOTAL CA: CPT

## 2024-01-19 PROCEDURE — 87798 DETECT AGENT NOS DNA AMP: CPT

## 2024-01-19 PROCEDURE — 74018 RADEX ABDOMEN 1 VIEW: CPT

## 2024-01-19 PROCEDURE — 82077 ASSAY SPEC XCP UR&BREATH IA: CPT

## 2024-01-19 PROCEDURE — 71046 X-RAY EXAM CHEST 2 VIEWS: CPT

## 2024-01-19 PROCEDURE — 87581 M.PNEUMON DNA AMP PROBE: CPT

## 2024-01-19 PROCEDURE — 80053 COMPREHEN METABOLIC PANEL: CPT

## 2024-01-19 PROCEDURE — 82553 CREATINE MB FRACTION: CPT

## 2024-01-19 PROCEDURE — 96375 TX/PRO/DX INJ NEW DRUG ADDON: CPT

## 2024-01-19 PROCEDURE — 83735 ASSAY OF MAGNESIUM: CPT

## 2024-01-19 PROCEDURE — 85027 COMPLETE CBC AUTOMATED: CPT

## 2024-01-19 PROCEDURE — 85025 COMPLETE CBC W/AUTO DIFF WBC: CPT

## 2024-01-19 PROCEDURE — 96366 THER/PROPH/DIAG IV INF ADDON: CPT

## 2024-01-19 PROCEDURE — 70450 CT HEAD/BRAIN W/O DYE: CPT

## 2024-01-19 PROCEDURE — 80143 DRUG ASSAY ACETAMINOPHEN: CPT

## 2024-01-19 PROCEDURE — 85049 AUTOMATED PLATELET COUNT: CPT

## 2024-01-19 PROCEDURE — 85610 PROTHROMBIN TIME: CPT

## 2024-01-19 PROCEDURE — 96376 TX/PRO/DX INJ SAME DRUG ADON: CPT

## 2024-01-19 PROCEDURE — 82140 ASSAY OF AMMONIA: CPT

## 2024-01-19 PROCEDURE — 36415 COLL VENOUS BLD VENIPUNCTURE: CPT

## 2024-01-19 PROCEDURE — 81002 URINALYSIS NONAUTO W/O SCOPE: CPT

## 2024-01-19 PROCEDURE — 93041 RHYTHM ECG TRACING: CPT

## 2024-01-19 PROCEDURE — 82550 ASSAY OF CK (CPK): CPT

## 2024-01-19 PROCEDURE — 93005 ELECTROCARDIOGRAM TRACING: CPT

## 2024-01-19 PROCEDURE — 94760 N-INVAS EAR/PLS OXIMETRY 1: CPT

## 2024-01-19 PROCEDURE — 80076 HEPATIC FUNCTION PANEL: CPT

## 2024-01-19 PROCEDURE — 96365 THER/PROPH/DIAG IV INF INIT: CPT

## 2024-01-19 PROCEDURE — 99285 EMERGENCY DEPT VISIT HI MDM: CPT

## 2024-01-19 PROCEDURE — 84443 ASSAY THYROID STIM HORMONE: CPT

## 2024-01-19 PROCEDURE — 87486 CHLMYD PNEUM DNA AMP PROBE: CPT

## 2024-01-19 RX ADMIN — INSULIN DETEMIR SCH UNITS: 100 INJECTION, SOLUTION SUBCUTANEOUS at 22:44

## 2024-01-19 RX ADMIN — EZETIMIBE SCH MG: 10 TABLET ORAL at 22:45

## 2024-01-19 RX ADMIN — CEFTRIAXONE SCH MLS/HR: 2 INJECTION, POWDER, FOR SOLUTION INTRAMUSCULAR; INTRAVENOUS at 17:37

## 2024-01-19 RX ADMIN — INSULIN LISPRO SCH UNITS: 100 INJECTION, SOLUTION INTRAVENOUS; SUBCUTANEOUS at 22:44

## 2024-01-19 RX ADMIN — RIFAXIMIN SCH MG: 550 TABLET ORAL at 22:44

## 2024-01-19 RX ADMIN — LACTULOSE SCH ML: 10 SOLUTION ORAL at 18:59

## 2024-01-19 RX ADMIN — ROSUVASTATIN CALCIUM SCH MG: 10 TABLET, FILM COATED ORAL at 22:45

## 2024-01-19 RX ADMIN — INSULIN LISPRO SCH UNITS: 100 INJECTION, SOLUTION INTRAVENOUS; SUBCUTANEOUS at 17:30

## 2024-01-19 RX ADMIN — APIXABAN SCH MG: 2.5 TABLET, FILM COATED ORAL at 22:45

## 2024-01-20 VITALS — TEMPERATURE: 97.8 F | DIASTOLIC BLOOD PRESSURE: 71 MMHG | OXYGEN SATURATION: 99 % | SYSTOLIC BLOOD PRESSURE: 126 MMHG

## 2024-01-20 VITALS — OXYGEN SATURATION: 97 % | DIASTOLIC BLOOD PRESSURE: 59 MMHG | TEMPERATURE: 98.4 F | SYSTOLIC BLOOD PRESSURE: 112 MMHG

## 2024-01-20 VITALS — SYSTOLIC BLOOD PRESSURE: 105 MMHG | OXYGEN SATURATION: 97 % | TEMPERATURE: 97.8 F | DIASTOLIC BLOOD PRESSURE: 56 MMHG

## 2024-01-20 VITALS — SYSTOLIC BLOOD PRESSURE: 148 MMHG | DIASTOLIC BLOOD PRESSURE: 70 MMHG | OXYGEN SATURATION: 98 % | TEMPERATURE: 98.1 F

## 2024-01-20 VITALS — TEMPERATURE: 98.3 F | OXYGEN SATURATION: 98 % | DIASTOLIC BLOOD PRESSURE: 62 MMHG | SYSTOLIC BLOOD PRESSURE: 130 MMHG

## 2024-01-20 VITALS — OXYGEN SATURATION: 96 % | SYSTOLIC BLOOD PRESSURE: 114 MMHG | DIASTOLIC BLOOD PRESSURE: 59 MMHG | TEMPERATURE: 97.3 F

## 2024-01-20 VITALS — DIASTOLIC BLOOD PRESSURE: 61 MMHG | OXYGEN SATURATION: 98 % | SYSTOLIC BLOOD PRESSURE: 125 MMHG | TEMPERATURE: 97.6 F

## 2024-01-20 LAB
ALBUMIN SERPL BCG-MCNC: 2.6 G/DL (ref 3.2–5.2)
ALP SERPL-CCNC: 167 U/L (ref 46–116)
ALT SERPL W P-5'-P-CCNC: 58 U/L (ref 7–40)
AST SERPL-CCNC: 66 U/L (ref ?–34)
BILIRUB SERPL-MCNC: 1.2 MG/DL (ref 0.3–1.2)
BUN SERPL-MCNC: 29 MG/DL (ref 9–23)
CALCIUM SERPL-MCNC: 8.6 MG/DL (ref 8.3–10.6)
CHLORIDE SERPL-SCNC: 113 MMOL/L (ref 98–107)
CO2 SERPL-SCNC: 24 MMOL/L (ref 20–31)
CREAT SERPL-MCNC: 1.5 MG/DL (ref 0.7–1.3)
GFR SERPL CREATININE-BSD FRML MDRD: 50.3 ML/MIN/{1.73_M2} (ref 49–?)
GLUCOSE SERPL-MCNC: 111 MG/DL (ref 74–106)
HCT VFR BLD AUTO: 31.1 % (ref 42–52)
HGB BLD-MCNC: 10.4 G/DL (ref 13.5–17.5)
MAGNESIUM SERPL-MCNC: 1.9 MG/DL (ref 1.8–2.4)
MCH RBC QN AUTO: 31.3 PG (ref 27–33)
MCHC RBC AUTO-ENTMCNC: 33.4 G/DL (ref 32–36.5)
MCV RBC AUTO: 93.7 FL (ref 80–96)
PLATELET # BLD AUTO: 80 10^3/UL (ref 150–450)
POTASSIUM SERPL-SCNC: 4 MMOL/L (ref 3.5–5.1)
PROT SERPL-MCNC: 6 G/DL (ref 5.7–8.2)
RBC # BLD AUTO: 3.32 10^6/UL (ref 4.3–6.1)
SODIUM SERPL-SCNC: 144 MMOL/L (ref 136–145)
WBC # BLD AUTO: 5.2 10^3/UL (ref 4–10)

## 2024-01-20 RX ADMIN — DAPAGLIFLOZIN SCH MG: 10 TABLET, FILM COATED ORAL at 08:45

## 2024-01-20 RX ADMIN — ONDANSETRON PRN MG: 2 INJECTION INTRAMUSCULAR; INTRAVENOUS at 10:21

## 2024-01-20 RX ADMIN — PANTOPRAZOLE SODIUM SCH MG: 40 TABLET, DELAYED RELEASE ORAL at 10:23

## 2024-01-20 RX ADMIN — INSULIN LISPRO SCH UNITS: 100 INJECTION, SOLUTION INTRAVENOUS; SUBCUTANEOUS at 18:35

## 2024-01-20 RX ADMIN — MAGNESIUM OXIDE SCH MG: 400 TABLET ORAL at 08:46

## 2024-01-20 RX ADMIN — ASPIRIN 81 MG CHEWABLE TABLET SCH MG: 81 TABLET CHEWABLE at 08:46

## 2024-01-20 RX ADMIN — RIFAXIMIN SCH MG: 550 TABLET ORAL at 20:49

## 2024-01-20 RX ADMIN — LATANOPROST SCH DROP: 50 SOLUTION OPHTHALMIC at 20:50

## 2024-01-20 RX ADMIN — LACTULOSE SCH ML: 10 SOLUTION ORAL at 00:38

## 2024-01-20 RX ADMIN — LACTULOSE SCH ML: 10 SOLUTION ORAL at 06:33

## 2024-01-20 RX ADMIN — PANTOPRAZOLE SODIUM SCH MG: 40 TABLET, DELAYED RELEASE ORAL at 20:49

## 2024-01-20 RX ADMIN — EZETIMIBE SCH MG: 10 TABLET ORAL at 20:49

## 2024-01-20 RX ADMIN — INSULIN LISPRO SCH UNITS: 100 INJECTION, SOLUTION INTRAVENOUS; SUBCUTANEOUS at 21:00

## 2024-01-20 RX ADMIN — LACTULOSE SCH ML: 10 SOLUTION ORAL at 12:50

## 2024-01-20 RX ADMIN — SODIUM CHLORIDE SCH MLS/HR: 9 INJECTION, SOLUTION INTRAVENOUS at 04:36

## 2024-01-20 RX ADMIN — CEFTRIAXONE SCH MLS/HR: 2 INJECTION, POWDER, FOR SOLUTION INTRAMUSCULAR; INTRAVENOUS at 17:39

## 2024-01-20 RX ADMIN — INSULIN LISPRO SCH UNITS: 100 INJECTION, SOLUTION INTRAVENOUS; SUBCUTANEOUS at 13:30

## 2024-01-20 RX ADMIN — APIXABAN SCH MG: 2.5 TABLET, FILM COATED ORAL at 08:46

## 2024-01-20 RX ADMIN — INSULIN DETEMIR SCH UNITS: 100 INJECTION, SOLUTION SUBCUTANEOUS at 20:48

## 2024-01-20 RX ADMIN — ROSUVASTATIN CALCIUM SCH MG: 10 TABLET, FILM COATED ORAL at 20:49

## 2024-01-20 RX ADMIN — ONDANSETRON PRN MG: 2 INJECTION INTRAMUSCULAR; INTRAVENOUS at 19:09

## 2024-01-20 RX ADMIN — APIXABAN SCH MG: 2.5 TABLET, FILM COATED ORAL at 20:49

## 2024-01-20 RX ADMIN — INSULIN LISPRO SCH UNITS: 100 INJECTION, SOLUTION INTRAVENOUS; SUBCUTANEOUS at 08:45

## 2024-01-20 RX ADMIN — LATANOPROST SCH DROP: 50 SOLUTION OPHTHALMIC at 00:37

## 2024-01-20 RX ADMIN — RIFAXIMIN SCH MG: 550 TABLET ORAL at 08:46

## 2024-01-20 RX ADMIN — LACTULOSE SCH ML: 10 SOLUTION ORAL at 17:38

## 2024-01-20 RX ADMIN — SODIUM CHLORIDE SCH MLS/HR: 9 INJECTION, SOLUTION INTRAVENOUS at 20:49

## 2024-01-20 RX ADMIN — Medication SCH MG: at 08:46

## 2024-01-21 VITALS — DIASTOLIC BLOOD PRESSURE: 64 MMHG | OXYGEN SATURATION: 98 % | TEMPERATURE: 99 F | SYSTOLIC BLOOD PRESSURE: 132 MMHG

## 2024-01-21 VITALS — SYSTOLIC BLOOD PRESSURE: 104 MMHG | OXYGEN SATURATION: 95 % | TEMPERATURE: 98.2 F | DIASTOLIC BLOOD PRESSURE: 57 MMHG

## 2024-01-21 LAB
ALBUMIN SERPL BCG-MCNC: 2.5 G/DL (ref 3.2–5.2)
ALP SERPL-CCNC: 174 U/L (ref 46–116)
ALT SERPL W P-5'-P-CCNC: 60 U/L (ref 7–40)
AST SERPL-CCNC: 75 U/L (ref ?–34)
BASOPHILS # BLD AUTO: 0 10^3/UL (ref 0–0.2)
BASOPHILS NFR BLD AUTO: 0.6 % (ref 0–1)
BILIRUB SERPL-MCNC: 1.2 MG/DL (ref 0.3–1.2)
BUN SERPL-MCNC: 27 MG/DL (ref 9–23)
CALCIUM SERPL-MCNC: 8.4 MG/DL (ref 8.3–10.6)
CHLORIDE SERPL-SCNC: 111 MMOL/L (ref 98–107)
CO2 SERPL-SCNC: 21 MMOL/L (ref 20–31)
CREAT SERPL-MCNC: 1.45 MG/DL (ref 0.7–1.3)
EOSINOPHIL # BLD AUTO: 0.1 10^3/UL (ref 0–0.5)
EOSINOPHIL NFR BLD AUTO: 2.1 % (ref 0–3)
GFR SERPL CREATININE-BSD FRML MDRD: 52.3 ML/MIN/{1.73_M2} (ref 49–?)
GLUCOSE SERPL-MCNC: 132 MG/DL (ref 74–106)
HCT VFR BLD AUTO: 30 % (ref 42–52)
HGB BLD-MCNC: 9.9 G/DL (ref 13.5–17.5)
LYMPHOCYTES # BLD AUTO: 0.7 10^3/UL (ref 1.5–5)
LYMPHOCYTES NFR BLD AUTO: 13.4 % (ref 24–44)
MCH RBC QN AUTO: 31.1 PG (ref 27–33)
MCHC RBC AUTO-ENTMCNC: 33 G/DL (ref 32–36.5)
MCV RBC AUTO: 94.3 FL (ref 80–96)
MONOCYTES # BLD AUTO: 0.5 10^3/UL (ref 0–0.8)
MONOCYTES NFR BLD AUTO: 10.5 % (ref 2–8)
NEUTROPHILS # BLD AUTO: 3.6 10^3/UL (ref 1.5–8.5)
NEUTROPHILS NFR BLD AUTO: 73.2 % (ref 36–66)
PLATELET # BLD AUTO: 77 10^3/UL (ref 150–450)
POTASSIUM SERPL-SCNC: 4.2 MMOL/L (ref 3.5–5.1)
PROT SERPL-MCNC: 5.7 G/DL (ref 5.7–8.2)
RBC # BLD AUTO: 3.18 10^6/UL (ref 4.3–6.1)
SODIUM SERPL-SCNC: 138 MMOL/L (ref 136–145)
WBC # BLD AUTO: 4.9 10^3/UL (ref 4–10)

## 2024-01-21 RX ADMIN — PANTOPRAZOLE SODIUM SCH MG: 40 TABLET, DELAYED RELEASE ORAL at 08:53

## 2024-01-21 RX ADMIN — DAPAGLIFLOZIN SCH MG: 10 TABLET, FILM COATED ORAL at 08:54

## 2024-01-21 RX ADMIN — ONDANSETRON PRN MG: 2 INJECTION INTRAMUSCULAR; INTRAVENOUS at 06:52

## 2024-01-21 RX ADMIN — LACTULOSE SCH ML: 10 SOLUTION ORAL at 00:00

## 2024-01-21 RX ADMIN — RIFAXIMIN SCH MG: 550 TABLET ORAL at 08:53

## 2024-01-21 RX ADMIN — ASPIRIN 81 MG CHEWABLE TABLET SCH MG: 81 TABLET CHEWABLE at 08:52

## 2024-01-21 RX ADMIN — APIXABAN SCH MG: 2.5 TABLET, FILM COATED ORAL at 08:54

## 2024-01-21 RX ADMIN — INSULIN LISPRO SCH UNITS: 100 INJECTION, SOLUTION INTRAVENOUS; SUBCUTANEOUS at 08:52

## 2024-01-21 RX ADMIN — Medication SCH MG: at 08:53

## 2024-01-21 RX ADMIN — LACTULOSE SCH ML: 10 SOLUTION ORAL at 06:00

## 2024-01-21 RX ADMIN — MAGNESIUM OXIDE SCH MG: 400 TABLET ORAL at 08:53

## 2024-01-28 ENCOUNTER — HOSPITAL ENCOUNTER (OUTPATIENT)
Dept: HOSPITAL 53 - M LAB | Age: 64
End: 2024-01-28
Attending: FAMILY MEDICINE
Payer: COMMERCIAL

## 2024-01-28 DIAGNOSIS — K76.82: ICD-10-CM

## 2024-01-28 DIAGNOSIS — D61.818: Primary | ICD-10-CM

## 2024-01-28 LAB
ALBUMIN SERPL BCG-MCNC: 2.5 G/DL (ref 3.2–5.2)
ALP SERPL-CCNC: 202 U/L (ref 46–116)
ALT SERPL W P-5'-P-CCNC: 64 U/L (ref 7–40)
AST SERPL-CCNC: 87 U/L (ref ?–34)
BASOPHILS # BLD AUTO: 0 10^3/UL (ref 0–0.2)
BASOPHILS NFR BLD AUTO: 0.7 % (ref 0–1)
BILIRUB SERPL-MCNC: 1.3 MG/DL (ref 0.3–1.2)
BUN SERPL-MCNC: 26 MG/DL (ref 9–23)
CALCIUM SERPL-MCNC: 8.5 MG/DL (ref 8.3–10.6)
CHLORIDE SERPL-SCNC: 109 MMOL/L (ref 98–107)
CO2 SERPL-SCNC: 24 MMOL/L (ref 20–31)
CREAT SERPL-MCNC: 1.41 MG/DL (ref 0.7–1.3)
EOSINOPHIL # BLD AUTO: 0.2 10^3/UL (ref 0–0.5)
EOSINOPHIL NFR BLD AUTO: 4.2 % (ref 0–3)
GFR SERPL CREATININE-BSD FRML MDRD: 54 ML/MIN/{1.73_M2} (ref 49–?)
GLUCOSE SERPL-MCNC: 123 MG/DL (ref 74–106)
HCT VFR BLD AUTO: 30.4 % (ref 42–52)
HGB BLD-MCNC: 10 G/DL (ref 13.5–17.5)
LYMPHOCYTES # BLD AUTO: 0.7 10^3/UL (ref 1.5–5)
LYMPHOCYTES NFR BLD AUTO: 15.3 % (ref 24–44)
MCH RBC QN AUTO: 31.5 PG (ref 27–33)
MCHC RBC AUTO-ENTMCNC: 32.9 G/DL (ref 32–36.5)
MCV RBC AUTO: 95.9 FL (ref 80–96)
MONOCYTES # BLD AUTO: 0.5 10^3/UL (ref 0–0.8)
MONOCYTES NFR BLD AUTO: 11.5 % (ref 2–8)
NEUTROPHILS # BLD AUTO: 3.1 10^3/UL (ref 1.5–8.5)
NEUTROPHILS NFR BLD AUTO: 68.1 % (ref 36–66)
PLATELET # BLD AUTO: 82 10^3/UL (ref 150–450)
POTASSIUM SERPL-SCNC: 4.3 MMOL/L (ref 3.5–5.1)
PROT SERPL-MCNC: 6.1 G/DL (ref 5.7–8.2)
RBC # BLD AUTO: 3.17 10^6/UL (ref 4.3–6.1)
SODIUM SERPL-SCNC: 139 MMOL/L (ref 136–145)
WBC # BLD AUTO: 4.5 10^3/UL (ref 4–10)

## 2024-02-15 ENCOUNTER — HOSPITAL ENCOUNTER (OUTPATIENT)
Dept: HOSPITAL 53 - M INFU | Age: 64
End: 2024-02-15
Attending: FAMILY MEDICINE
Payer: COMMERCIAL

## 2024-02-15 VITALS — WEIGHT: 224.43 LBS | BODY MASS INDEX: 31.42 KG/M2 | HEIGHT: 71 IN

## 2024-02-15 VITALS — OXYGEN SATURATION: 98 % | SYSTOLIC BLOOD PRESSURE: 107 MMHG | DIASTOLIC BLOOD PRESSURE: 55 MMHG

## 2024-02-15 DIAGNOSIS — D50.9: Primary | ICD-10-CM

## 2024-02-15 DIAGNOSIS — Z88.8: ICD-10-CM

## 2024-02-15 DIAGNOSIS — Z88.5: ICD-10-CM

## 2024-02-15 PROCEDURE — 96365 THER/PROPH/DIAG IV INF INIT: CPT

## 2024-02-15 RX ADMIN — SODIUM CHLORIDE ONE MLS/HR: 9 INJECTION, SOLUTION INTRAVENOUS at 14:56

## 2024-02-22 ENCOUNTER — HOSPITAL ENCOUNTER (OUTPATIENT)
Dept: HOSPITAL 53 - M INFU | Age: 64
End: 2024-02-22
Attending: FAMILY MEDICINE
Payer: COMMERCIAL

## 2024-02-22 VITALS — DIASTOLIC BLOOD PRESSURE: 62 MMHG | SYSTOLIC BLOOD PRESSURE: 124 MMHG | OXYGEN SATURATION: 99 %

## 2024-02-22 VITALS — DIASTOLIC BLOOD PRESSURE: 56 MMHG | OXYGEN SATURATION: 98 % | SYSTOLIC BLOOD PRESSURE: 115 MMHG

## 2024-02-22 VITALS — WEIGHT: 224.43 LBS | BODY MASS INDEX: 31.42 KG/M2 | HEIGHT: 71 IN

## 2024-02-22 DIAGNOSIS — Z88.5: ICD-10-CM

## 2024-02-22 DIAGNOSIS — D50.9: Primary | ICD-10-CM

## 2024-02-22 DIAGNOSIS — Z88.8: ICD-10-CM

## 2024-02-22 PROCEDURE — 96365 THER/PROPH/DIAG IV INF INIT: CPT

## 2024-02-22 RX ADMIN — SODIUM CHLORIDE ONE MLS/HR: 9 INJECTION, SOLUTION INTRAVENOUS at 15:17

## 2024-03-01 ENCOUNTER — HOSPITAL ENCOUNTER (OUTPATIENT)
Dept: HOSPITAL 53 - M LABDRAWC | Age: 64
End: 2024-03-01
Attending: NURSE PRACTITIONER
Payer: COMMERCIAL

## 2024-03-01 DIAGNOSIS — I48.92: ICD-10-CM

## 2024-03-01 DIAGNOSIS — I48.91: Primary | ICD-10-CM

## 2024-03-01 LAB
BUN SERPL-MCNC: 29 MG/DL (ref 9–23)
CALCIUM SERPL-MCNC: 8.2 MG/DL (ref 8.3–10.6)
CHLORIDE SERPL-SCNC: 107 MMOL/L (ref 98–107)
CO2 SERPL-SCNC: 29 MMOL/L (ref 20–31)
CREAT SERPL-MCNC: 1.56 MG/DL (ref 0.7–1.3)
GFR SERPL CREATININE-BSD FRML MDRD: 47.9 ML/MIN/{1.73_M2} (ref 49–?)
GLUCOSE SERPL-MCNC: 128 MG/DL (ref 74–106)
POTASSIUM SERPL-SCNC: 4.3 MMOL/L (ref 3.5–5.1)
SODIUM SERPL-SCNC: 141 MMOL/L (ref 136–145)

## 2024-03-09 ENCOUNTER — HOSPITAL ENCOUNTER (OUTPATIENT)
Dept: HOSPITAL 53 - M LAB | Age: 64
End: 2024-03-09
Attending: THORACIC SURGERY (CARDIOTHORACIC VASCULAR SURGERY)
Payer: COMMERCIAL

## 2024-03-09 DIAGNOSIS — C22.0: Primary | ICD-10-CM

## 2024-03-09 DIAGNOSIS — Z76.82: ICD-10-CM

## 2024-03-09 LAB
ALBUMIN SERPL BCG-MCNC: 2.5 G/DL (ref 3.2–5.2)
ALP SERPL-CCNC: 298 U/L (ref 46–116)
ALT SERPL W P-5'-P-CCNC: 80 U/L (ref 7–40)
APTT BLD: 36.1 SECONDS (ref 24.8–34.2)
AST SERPL-CCNC: 129 U/L (ref ?–34)
BASOPHILS # BLD AUTO: 0 10^3/UL (ref 0–0.2)
BASOPHILS NFR BLD AUTO: 0.7 % (ref 0–1)
BILIRUB SERPL-MCNC: 2.1 MG/DL (ref 0.3–1.2)
BUN SERPL-MCNC: 24 MG/DL (ref 9–23)
CALCIUM SERPL-MCNC: 7.8 MG/DL (ref 8.3–10.6)
CHLORIDE SERPL-SCNC: 109 MMOL/L (ref 98–107)
CO2 SERPL-SCNC: 27 MMOL/L (ref 20–31)
CREAT SERPL-MCNC: 1.47 MG/DL (ref 0.7–1.3)
EOSINOPHIL # BLD AUTO: 0.2 10^3/UL (ref 0–0.5)
EOSINOPHIL NFR BLD AUTO: 2.9 % (ref 0–3)
FIBRINOGEN PPP-MCNC: 355 MG/DL (ref 268–480)
GFR SERPL CREATININE-BSD FRML MDRD: 51.3 ML/MIN/{1.73_M2} (ref 49–?)
GLUCOSE SERPL-MCNC: 119 MG/DL (ref 74–106)
HCT VFR BLD AUTO: 32.4 % (ref 42–52)
HGB BLD-MCNC: 10.9 G/DL (ref 13.5–17.5)
INR PPP: 1.43
LYMPHOCYTES # BLD AUTO: 0.8 10^3/UL (ref 1.5–5)
LYMPHOCYTES NFR BLD AUTO: 14.6 % (ref 24–44)
MCH RBC QN AUTO: 33.2 PG (ref 27–33)
MCHC RBC AUTO-ENTMCNC: 33.6 G/DL (ref 32–36.5)
MCV RBC AUTO: 98.8 FL (ref 80–96)
MONOCYTES # BLD AUTO: 0.6 10^3/UL (ref 0–0.8)
MONOCYTES NFR BLD AUTO: 11.2 % (ref 2–8)
NEUTROPHILS # BLD AUTO: 3.9 10^3/UL (ref 1.5–8.5)
NEUTROPHILS NFR BLD AUTO: 70.1 % (ref 36–66)
PLATELET # BLD AUTO: 101 10^3/UL (ref 150–450)
POTASSIUM SERPL-SCNC: 4.3 MMOL/L (ref 3.5–5.1)
PROT SERPL-MCNC: 6.3 G/DL (ref 5.7–8.2)
PROTHROMBIN TIME: 17 SECONDS (ref 12.5–14.5)
RBC # BLD AUTO: 3.28 10^6/UL (ref 4.3–6.1)
SODIUM SERPL-SCNC: 140 MMOL/L (ref 136–145)
WBC # BLD AUTO: 5.5 10^3/UL (ref 4–10)

## 2024-04-01 ENCOUNTER — HOSPITAL ENCOUNTER (OUTPATIENT)
Dept: HOSPITAL 53 - M LABDRAWC | Age: 64
End: 2024-04-01
Attending: FAMILY MEDICINE
Payer: COMMERCIAL

## 2024-04-01 DIAGNOSIS — E11.22: Primary | ICD-10-CM

## 2024-04-01 LAB
ALBUMIN SERPL BCG-MCNC: 2.7 G/DL (ref 3.2–5.2)
ALP SERPL-CCNC: 230 U/L (ref 46–116)
ALT SERPL W P-5'-P-CCNC: 43 U/L (ref 7–40)
AST SERPL-CCNC: 72 U/L (ref ?–34)
BILIRUB SERPL-MCNC: 1.7 MG/DL (ref 0.3–1.2)
BUN SERPL-MCNC: 24 MG/DL (ref 9–23)
CALCIUM SERPL-MCNC: 8.8 MG/DL (ref 8.3–10.6)
CHLORIDE SERPL-SCNC: 104 MMOL/L (ref 98–107)
CO2 SERPL-SCNC: 30 MMOL/L (ref 20–31)
CREAT SERPL-MCNC: 2.29 MG/DL (ref 0.7–1.3)
EST. AVERAGE GLUCOSE BLD GHB EST-MCNC: 143 MG/DL (ref 60–110)
GFR SERPL CREATININE-BSD FRML MDRD: 30.8 ML/MIN/{1.73_M2} (ref 49–?)
GLUCOSE SERPL-MCNC: 190 MG/DL (ref 74–106)
POTASSIUM SERPL-SCNC: 4.5 MMOL/L (ref 3.5–5.1)
PROT SERPL-MCNC: 6.2 G/DL (ref 5.7–8.2)
SODIUM SERPL-SCNC: 138 MMOL/L (ref 136–145)

## 2024-04-10 ENCOUNTER — HOSPITAL ENCOUNTER (OUTPATIENT)
Dept: HOSPITAL 53 - M LAB | Age: 64
End: 2024-04-10
Attending: FAMILY MEDICINE
Payer: COMMERCIAL

## 2024-04-10 ENCOUNTER — HOSPITAL ENCOUNTER (OUTPATIENT)
Dept: HOSPITAL 53 - M LAB | Age: 64
End: 2024-04-10
Attending: THORACIC SURGERY (CARDIOTHORACIC VASCULAR SURGERY)
Payer: COMMERCIAL

## 2024-04-10 DIAGNOSIS — K74.60: Primary | ICD-10-CM

## 2024-04-10 DIAGNOSIS — C22.0: ICD-10-CM

## 2024-04-10 DIAGNOSIS — Z76.82: Primary | ICD-10-CM

## 2024-04-10 LAB
ALBUMIN SERPL BCG-MCNC: 2.7 G/DL (ref 3.2–5.2)
ALBUMIN SERPL BCG-MCNC: 2.7 G/DL (ref 3.2–5.2)
ALP SERPL-CCNC: 232 U/L (ref 46–116)
ALP SERPL-CCNC: 234 U/L (ref 46–116)
ALT SERPL W P-5'-P-CCNC: 48 U/L (ref 7–40)
ALT SERPL W P-5'-P-CCNC: 48 U/L (ref 7–40)
AST SERPL-CCNC: 85 U/L (ref ?–34)
AST SERPL-CCNC: 85 U/L (ref ?–34)
BASOPHILS # BLD AUTO: 0.1 10^3/UL (ref 0–0.2)
BASOPHILS # BLD AUTO: 0.1 10^3/UL (ref 0–0.2)
BASOPHILS NFR BLD AUTO: 0.8 % (ref 0–1)
BASOPHILS NFR BLD AUTO: 1 % (ref 0–1)
BILIRUB SERPL-MCNC: 2.4 MG/DL (ref 0.3–1.2)
BILIRUB SERPL-MCNC: 2.4 MG/DL (ref 0.3–1.2)
BUN SERPL-MCNC: 25 MG/DL (ref 9–23)
BUN SERPL-MCNC: 25 MG/DL (ref 9–23)
CALCIUM SERPL-MCNC: 8.8 MG/DL (ref 8.3–10.6)
CALCIUM SERPL-MCNC: 9 MG/DL (ref 8.3–10.6)
CHLORIDE SERPL-SCNC: 105 MMOL/L (ref 98–107)
CHLORIDE SERPL-SCNC: 105 MMOL/L (ref 98–107)
CO2 SERPL-SCNC: 30 MMOL/L (ref 20–31)
CO2 SERPL-SCNC: 30 MMOL/L (ref 20–31)
CREAT SERPL-MCNC: 2.02 MG/DL (ref 0.7–1.3)
CREAT SERPL-MCNC: 2.05 MG/DL (ref 0.7–1.3)
EOSINOPHIL # BLD AUTO: 0.2 10^3/UL (ref 0–0.5)
EOSINOPHIL # BLD AUTO: 0.2 10^3/UL (ref 0–0.5)
EOSINOPHIL NFR BLD AUTO: 2.8 % (ref 0–3)
EOSINOPHIL NFR BLD AUTO: 2.8 % (ref 0–3)
GFR SERPL CREATININE-BSD FRML MDRD: 35 ML/MIN/{1.73_M2} (ref 49–?)
GFR SERPL CREATININE-BSD FRML MDRD: 35.6 ML/MIN/{1.73_M2} (ref 49–?)
GLUCOSE SERPL-MCNC: 153 MG/DL (ref 74–106)
GLUCOSE SERPL-MCNC: 153 MG/DL (ref 74–106)
HCT VFR BLD AUTO: 30.5 % (ref 42–52)
HCT VFR BLD AUTO: 31 % (ref 42–52)
HGB BLD-MCNC: 10.2 G/DL (ref 13.5–17.5)
HGB BLD-MCNC: 10.7 G/DL (ref 13.5–17.5)
INR PPP: 1.29
LYMPHOCYTES # BLD AUTO: 1 10^3/UL (ref 1.5–5)
LYMPHOCYTES # BLD AUTO: 1.1 10^3/UL (ref 1.5–5)
LYMPHOCYTES NFR BLD AUTO: 16.9 % (ref 24–44)
LYMPHOCYTES NFR BLD AUTO: 17.9 % (ref 24–44)
MCH RBC QN AUTO: 32.5 PG (ref 27–33)
MCH RBC QN AUTO: 32.9 PG (ref 27–33)
MCHC RBC AUTO-ENTMCNC: 33.4 G/DL (ref 32–36.5)
MCHC RBC AUTO-ENTMCNC: 34.5 G/DL (ref 32–36.5)
MCV RBC AUTO: 95.4 FL (ref 80–96)
MCV RBC AUTO: 97.1 FL (ref 80–96)
MONOCYTES # BLD AUTO: 0.6 10^3/UL (ref 0–0.8)
MONOCYTES # BLD AUTO: 0.6 10^3/UL (ref 0–0.8)
MONOCYTES NFR BLD AUTO: 9.4 % (ref 2–8)
MONOCYTES NFR BLD AUTO: 9.9 % (ref 2–8)
NEUTROPHILS # BLD AUTO: 4.2 10^3/UL (ref 1.5–8.5)
NEUTROPHILS # BLD AUTO: 4.2 10^3/UL (ref 1.5–8.5)
NEUTROPHILS NFR BLD AUTO: 68.6 % (ref 36–66)
NEUTROPHILS NFR BLD AUTO: 69.3 % (ref 36–66)
PLATELET # BLD AUTO: 106 10^3/UL (ref 150–450)
PLATELET # BLD AUTO: 107 10^3/UL (ref 150–450)
POTASSIUM SERPL-SCNC: 3.4 MMOL/L (ref 3.5–5.1)
POTASSIUM SERPL-SCNC: 3.5 MMOL/L (ref 3.5–5.1)
PROT SERPL-MCNC: 6.4 G/DL (ref 5.7–8.2)
PROT SERPL-MCNC: 6.4 G/DL (ref 5.7–8.2)
PROTHROMBIN TIME: 15.7 SECONDS (ref 12.5–14.5)
RBC # BLD AUTO: 3.14 10^6/UL (ref 4.3–6.1)
RBC # BLD AUTO: 3.25 10^6/UL (ref 4.3–6.1)
SODIUM SERPL-SCNC: 143 MMOL/L (ref 136–145)
SODIUM SERPL-SCNC: 143 MMOL/L (ref 136–145)
WBC # BLD AUTO: 6.1 10^3/UL (ref 4–10)
WBC # BLD AUTO: 6.1 10^3/UL (ref 4–10)

## 2024-04-19 ENCOUNTER — HOSPITAL ENCOUNTER (EMERGENCY)
Dept: HOSPITAL 53 - M ED | Age: 64
Discharge: ANOTHER HOSPITAL | End: 2024-04-19
Payer: COMMERCIAL

## 2024-04-19 VITALS — SYSTOLIC BLOOD PRESSURE: 126 MMHG | OXYGEN SATURATION: 99 % | DIASTOLIC BLOOD PRESSURE: 64 MMHG

## 2024-04-19 VITALS — BODY MASS INDEX: 33.43 KG/M2 | WEIGHT: 212.97 LBS | HEIGHT: 67 IN

## 2024-04-19 VITALS — TEMPERATURE: 98.6 F

## 2024-04-19 DIAGNOSIS — I48.91: ICD-10-CM

## 2024-04-19 DIAGNOSIS — Z79.899: ICD-10-CM

## 2024-04-19 DIAGNOSIS — Z85.05: ICD-10-CM

## 2024-04-19 DIAGNOSIS — K76.82: Primary | ICD-10-CM

## 2024-04-19 DIAGNOSIS — E11.9: ICD-10-CM

## 2024-04-19 DIAGNOSIS — E78.5: ICD-10-CM

## 2024-04-19 DIAGNOSIS — I10: ICD-10-CM

## 2024-04-19 DIAGNOSIS — Z88.5: ICD-10-CM

## 2024-04-19 DIAGNOSIS — N18.9: ICD-10-CM

## 2024-04-19 DIAGNOSIS — I25.10: ICD-10-CM

## 2024-04-19 DIAGNOSIS — E80.4: ICD-10-CM

## 2024-04-19 DIAGNOSIS — Z88.8: ICD-10-CM

## 2024-04-19 LAB
ALBUMIN SERPL BCG-MCNC: 2.6 G/DL (ref 3.2–5.2)
ALP SERPL-CCNC: 263 U/L (ref 46–116)
ALT SERPL W P-5'-P-CCNC: 43 U/L (ref 7–40)
AST SERPL-CCNC: 72 U/L (ref ?–34)
BASE EXCESS BLDV CALC-SCNC: -0.2 MMOL/L (ref -2–2)
BASOPHILS # BLD AUTO: 0 10^3/UL (ref 0–0.2)
BASOPHILS NFR BLD AUTO: 0.7 % (ref 0–1)
BILIRUB CONJ SERPL-MCNC: 0.8 MG/DL (ref ?–0.4)
BILIRUB SERPL-MCNC: 2.2 MG/DL (ref 0.3–1.2)
BUN SERPL-MCNC: 27 MG/DL (ref 9–23)
CALCIUM SERPL-MCNC: 8.9 MG/DL (ref 8.3–10.6)
CHLORIDE SERPL-SCNC: 105 MMOL/L (ref 98–107)
CO2 BLDV CALC-SCNC: 24.2 MMOL/L (ref 24–28)
CO2 SERPL-SCNC: 26 MMOL/L (ref 20–31)
CREAT SERPL-MCNC: 2.15 MG/DL (ref 0.7–1.3)
EOSINOPHIL # BLD AUTO: 0.1 10^3/UL (ref 0–0.5)
EOSINOPHIL NFR BLD AUTO: 2.3 % (ref 0–3)
GFR SERPL CREATININE-BSD FRML MDRD: 33.1 ML/MIN/{1.73_M2} (ref 49–?)
GLUCOSE SERPL-MCNC: 195 MG/DL (ref 74–106)
HCO3 BLDV-SCNC: 23.2 MMOL/L (ref 23–27)
HCO3 STD BLDV-SCNC: 24.1 MMOL/L
HCT VFR BLD AUTO: 30.7 % (ref 42–52)
HGB BLD-MCNC: 10.4 G/DL (ref 13.5–17.5)
LYMPHOCYTES # BLD AUTO: 0.9 10^3/UL (ref 1.5–5)
LYMPHOCYTES NFR BLD AUTO: 14.2 % (ref 24–44)
MAGNESIUM SERPL-MCNC: 2.4 MG/DL (ref 1.8–2.4)
MCH RBC QN AUTO: 32.9 PG (ref 27–33)
MCHC RBC AUTO-ENTMCNC: 33.9 G/DL (ref 32–36.5)
MCV RBC AUTO: 97.2 FL (ref 80–96)
MONOCYTES # BLD AUTO: 0.5 10^3/UL (ref 0–0.8)
MONOCYTES NFR BLD AUTO: 8.7 % (ref 2–8)
NEUTROPHILS # BLD AUTO: 4.4 10^3/UL (ref 1.5–8.5)
NEUTROPHILS NFR BLD AUTO: 73.9 % (ref 36–66)
OSMOLALITY SERPL: 304 MOSM/KG (ref 280–301)
PCO2 BLDV: 33.4 MMHG (ref 38–50)
PH BLDV: 7.46 UNITS (ref 7.33–7.43)
PLATELET # BLD AUTO: 115 10^3/UL (ref 150–450)
PO2 BLDV: 47.9 MMHG (ref 30–50)
POTASSIUM SERPL-SCNC: 3.7 MMOL/L (ref 3.5–5.1)
PROT SERPL-MCNC: 6.5 G/DL (ref 5.7–8.2)
RBC # BLD AUTO: 3.16 10^6/UL (ref 4.3–6.1)
SAO2 % BLDV: 83.2 % (ref 60–80)
SODIUM SERPL-SCNC: 136 MMOL/L (ref 136–145)
TSH SERPL DL<=0.005 MIU/L-ACNC: 2.46 UIU/ML (ref 0.55–4.78)
WBC # BLD AUTO: 6 10^3/UL (ref 4–10)

## 2024-04-19 PROCEDURE — 94760 N-INVAS EAR/PLS OXIMETRY 1: CPT

## 2024-04-19 PROCEDURE — 87581 M.PNEUMON DNA AMP PROBE: CPT

## 2024-04-19 PROCEDURE — 80048 BASIC METABOLIC PNL TOTAL CA: CPT

## 2024-04-19 PROCEDURE — 96361 HYDRATE IV INFUSION ADD-ON: CPT

## 2024-04-19 PROCEDURE — 82140 ASSAY OF AMMONIA: CPT

## 2024-04-19 PROCEDURE — 83605 ASSAY OF LACTIC ACID: CPT

## 2024-04-19 PROCEDURE — 96360 HYDRATION IV INFUSION INIT: CPT

## 2024-04-19 PROCEDURE — 82803 BLOOD GASES ANY COMBINATION: CPT

## 2024-04-19 PROCEDURE — 87486 CHLMYD PNEUM DNA AMP PROBE: CPT

## 2024-04-19 PROCEDURE — 80076 HEPATIC FUNCTION PANEL: CPT

## 2024-04-19 PROCEDURE — 87040 BLOOD CULTURE FOR BACTERIA: CPT

## 2024-04-19 PROCEDURE — 99285 EMERGENCY DEPT VISIT HI MDM: CPT

## 2024-04-19 PROCEDURE — 71045 X-RAY EXAM CHEST 1 VIEW: CPT

## 2024-04-19 PROCEDURE — 70450 CT HEAD/BRAIN W/O DYE: CPT

## 2024-04-19 PROCEDURE — 83735 ASSAY OF MAGNESIUM: CPT

## 2024-04-19 PROCEDURE — 87798 DETECT AGENT NOS DNA AMP: CPT

## 2024-04-19 PROCEDURE — 84443 ASSAY THYROID STIM HORMONE: CPT

## 2024-04-19 PROCEDURE — 87633 RESP VIRUS 12-25 TARGETS: CPT

## 2024-04-19 PROCEDURE — 93041 RHYTHM ECG TRACING: CPT

## 2024-04-19 PROCEDURE — 85025 COMPLETE CBC W/AUTO DIFF WBC: CPT

## 2024-04-19 PROCEDURE — 93005 ELECTROCARDIOGRAM TRACING: CPT

## 2024-04-19 PROCEDURE — 83930 ASSAY OF BLOOD OSMOLALITY: CPT

## 2024-04-19 RX ADMIN — INSULIN LISPRO SCH UNITS: 100 INJECTION, SOLUTION INTRAVENOUS; SUBCUTANEOUS at 17:58

## 2024-04-19 RX ADMIN — SODIUM CHLORIDE SCH MLS/HR: 9 INJECTION, SOLUTION INTRAVENOUS at 13:01

## 2024-04-19 RX ADMIN — LACTULOSE ONE ML: 10 SOLUTION ORAL at 13:01

## 2024-04-25 ENCOUNTER — HOSPITAL ENCOUNTER (OUTPATIENT)
Dept: HOSPITAL 53 - M PLALAB | Age: 64
End: 2024-04-25
Attending: FAMILY MEDICINE
Payer: COMMERCIAL

## 2024-04-25 DIAGNOSIS — D50.9: ICD-10-CM

## 2024-04-25 DIAGNOSIS — E11.22: ICD-10-CM

## 2024-04-25 DIAGNOSIS — N18.4: Primary | ICD-10-CM

## 2024-04-25 LAB
ALBUMIN SERPL BCG-MCNC: 2.9 G/DL (ref 3.2–5.2)
ALP SERPL-CCNC: 221 U/L (ref 46–116)
ALT SERPL W P-5'-P-CCNC: 45 U/L (ref 7–40)
AST SERPL-CCNC: 81 U/L (ref ?–34)
BASOPHILS # BLD AUTO: 0 10^3/UL (ref 0–0.2)
BASOPHILS NFR BLD AUTO: 0.8 % (ref 0–1)
BILIRUB SERPL-MCNC: 1.6 MG/DL (ref 0.3–1.2)
BUN SERPL-MCNC: 17 MG/DL (ref 9–23)
CALCIUM SERPL-MCNC: 8.9 MG/DL (ref 8.3–10.6)
CHLORIDE SERPL-SCNC: 108 MMOL/L (ref 98–107)
CO2 SERPL-SCNC: 25 MMOL/L (ref 20–31)
CREAT SERPL-MCNC: 1.52 MG/DL (ref 0.7–1.3)
EOSINOPHIL # BLD AUTO: 0.2 10^3/UL (ref 0–0.5)
EOSINOPHIL NFR BLD AUTO: 3.4 % (ref 0–3)
EST. AVERAGE GLUCOSE BLD GHB EST-MCNC: 140 MG/DL (ref 60–110)
FERRITIN SERPL-MCNC: 78.2 NG/ML (ref 10.5–307.3)
GFR SERPL CREATININE-BSD FRML MDRD: 49.4 ML/MIN/{1.73_M2} (ref 49–?)
GLUCOSE SERPL-MCNC: 169 MG/DL (ref 74–106)
HCT VFR BLD AUTO: 28.1 % (ref 42–52)
HGB BLD-MCNC: 9 G/DL (ref 13.5–17.5)
IRON SATN MFR SERPL: 14.5 % (ref 19.7–50)
IRON SERPL-MCNC: 35 UG/DL (ref 65–175)
LYMPHOCYTES # BLD AUTO: 0.7 10^3/UL (ref 1.5–5)
LYMPHOCYTES NFR BLD AUTO: 14.5 % (ref 24–44)
MCH RBC QN AUTO: 32.7 PG (ref 27–33)
MCHC RBC AUTO-ENTMCNC: 32 G/DL (ref 32–36.5)
MCV RBC AUTO: 102.2 FL (ref 80–96)
MONOCYTES # BLD AUTO: 0.6 10^3/UL (ref 0–0.8)
MONOCYTES NFR BLD AUTO: 10.9 % (ref 2–8)
NEUTROPHILS # BLD AUTO: 3.5 10^3/UL (ref 1.5–8.5)
NEUTROPHILS NFR BLD AUTO: 70 % (ref 36–66)
PLATELET # BLD AUTO: 94 10^3/UL (ref 150–450)
POTASSIUM SERPL-SCNC: 4.2 MMOL/L (ref 3.5–5.1)
PROT SERPL-MCNC: 5.8 G/DL (ref 5.7–8.2)
RBC # BLD AUTO: 2.75 10^6/UL (ref 4.3–6.1)
SODIUM SERPL-SCNC: 140 MMOL/L (ref 136–145)
TIBC SERPL-MCNC: 241 UG/DL (ref 250–425)
WBC # BLD AUTO: 5 10^3/UL (ref 4–10)

## 2024-04-29 ENCOUNTER — HOSPITAL ENCOUNTER (EMERGENCY)
Dept: HOSPITAL 53 - M ED | Age: 64
Discharge: ANOTHER HOSPITAL | End: 2024-04-29
Payer: COMMERCIAL

## 2024-04-29 VITALS — TEMPERATURE: 96.9 F | SYSTOLIC BLOOD PRESSURE: 112 MMHG | OXYGEN SATURATION: 98 % | DIASTOLIC BLOOD PRESSURE: 55 MMHG

## 2024-04-29 VITALS — WEIGHT: 233.47 LBS | BODY MASS INDEX: 32.69 KG/M2 | HEIGHT: 71 IN

## 2024-04-29 DIAGNOSIS — Z79.4: ICD-10-CM

## 2024-04-29 DIAGNOSIS — Z88.8: ICD-10-CM

## 2024-04-29 DIAGNOSIS — R55: ICD-10-CM

## 2024-04-29 DIAGNOSIS — K72.90: Primary | ICD-10-CM

## 2024-04-29 DIAGNOSIS — I48.91: ICD-10-CM

## 2024-04-29 DIAGNOSIS — Z79.899: ICD-10-CM

## 2024-04-29 DIAGNOSIS — E11.9: ICD-10-CM

## 2024-04-29 DIAGNOSIS — E72.20: ICD-10-CM

## 2024-04-29 LAB
ALBUMIN SERPL BCG-MCNC: 2.9 G/DL (ref 3.2–5.2)
ALP SERPL-CCNC: 236 U/L (ref 46–116)
ALT SERPL W P-5'-P-CCNC: 41 U/L (ref 7–40)
APTT BLD: 36.7 SECONDS (ref 24.8–34.2)
AST SERPL-CCNC: 71 U/L (ref ?–34)
BASOPHILS # BLD AUTO: 0 10^3/UL (ref 0–0.2)
BASOPHILS NFR BLD AUTO: 0.8 % (ref 0–1)
BILIRUB CONJ SERPL-MCNC: 0.8 MG/DL (ref ?–0.4)
BILIRUB SERPL-MCNC: 2 MG/DL (ref 0.3–1.2)
BUN SERPL-MCNC: 22 MG/DL (ref 9–23)
CALCIUM SERPL-MCNC: 8 MG/DL (ref 8.3–10.6)
CHLORIDE SERPL-SCNC: 104 MMOL/L (ref 98–107)
CK MB CFR.DF SERPL CALC: 1.69
CK MB SERPL-MCNC: < 1 NG/ML (ref ?–3.6)
CK SERPL-CCNC: 59 U/L (ref 46–171)
CO2 SERPL-SCNC: 26 MMOL/L (ref 20–31)
CREAT SERPL-MCNC: 1.89 MG/DL (ref 0.7–1.3)
CRP SERPL-MCNC: 1 MG/DL (ref ?–1)
EOSINOPHIL # BLD AUTO: 0.1 10^3/UL (ref 0–0.5)
EOSINOPHIL NFR BLD AUTO: 2.8 % (ref 0–3)
ERYTHROCYTE [SEDIMENTATION RATE] IN BLOOD BY WESTERGREN METHOD: 25 MM/HR (ref 0–20)
GFR SERPL CREATININE-BSD FRML MDRD: 38.4 ML/MIN/{1.73_M2} (ref 49–?)
GLUCOSE SERPL-MCNC: 220 MG/DL (ref 74–106)
HCT VFR BLD AUTO: 27.4 % (ref 42–52)
HGB BLD-MCNC: 9 G/DL (ref 13.5–17.5)
INR PPP: 1.35
LIPASE SERPL-CCNC: 78 U/L (ref 12–53)
LYMPHOCYTES # BLD AUTO: 0.4 10^3/UL (ref 1.5–5)
LYMPHOCYTES NFR BLD AUTO: 10.9 % (ref 24–44)
MCH RBC QN AUTO: 33 PG (ref 27–33)
MCHC RBC AUTO-ENTMCNC: 32.8 G/DL (ref 32–36.5)
MCV RBC AUTO: 100.4 FL (ref 80–96)
MONOCYTES # BLD AUTO: 0.5 10^3/UL (ref 0–0.8)
MONOCYTES NFR BLD AUTO: 12.4 % (ref 2–8)
NEUTROPHILS # BLD AUTO: 2.9 10^3/UL (ref 1.5–8.5)
NEUTROPHILS NFR BLD AUTO: 72.8 % (ref 36–66)
PLATELET # BLD AUTO: 90 10^3/UL (ref 150–450)
POTASSIUM SERPL-SCNC: 4 MMOL/L (ref 3.5–5.1)
PROCALCITONIN SERPL-MCNC: 0.21 NG/ML
PROT SERPL-MCNC: 6.1 G/DL (ref 5.7–8.2)
PROTHROMBIN TIME: 16.3 SECONDS (ref 12.5–14.5)
RBC # BLD AUTO: 2.73 10^6/UL (ref 4.3–6.1)
SODIUM SERPL-SCNC: 139 MMOL/L (ref 136–145)
T4 FREE SERPL-MCNC: 1.13 NG/DL (ref 0.89–1.76)
TSH SERPL DL<=0.005 MIU/L-ACNC: 3 UIU/ML (ref 0.55–4.78)
WBC # BLD AUTO: 4 10^3/UL (ref 4–10)

## 2024-04-29 PROCEDURE — 96374 THER/PROPH/DIAG INJ IV PUSH: CPT

## 2024-04-29 PROCEDURE — 93005 ELECTROCARDIOGRAM TRACING: CPT

## 2024-04-29 PROCEDURE — 71045 X-RAY EXAM CHEST 1 VIEW: CPT

## 2024-04-29 PROCEDURE — 85730 THROMBOPLASTIN TIME PARTIAL: CPT

## 2024-04-29 PROCEDURE — 85055 RETICULATED PLATELET ASSAY: CPT

## 2024-04-29 PROCEDURE — 99285 EMERGENCY DEPT VISIT HI MDM: CPT

## 2024-04-29 PROCEDURE — 87798 DETECT AGENT NOS DNA AMP: CPT

## 2024-04-29 PROCEDURE — 82553 CREATINE MB FRACTION: CPT

## 2024-04-29 PROCEDURE — 84484 ASSAY OF TROPONIN QUANT: CPT

## 2024-04-29 PROCEDURE — 87040 BLOOD CULTURE FOR BACTERIA: CPT

## 2024-04-29 PROCEDURE — 85610 PROTHROMBIN TIME: CPT

## 2024-04-29 PROCEDURE — 70450 CT HEAD/BRAIN W/O DYE: CPT

## 2024-04-29 PROCEDURE — 85652 RBC SED RATE AUTOMATED: CPT

## 2024-04-29 PROCEDURE — 87581 M.PNEUMON DNA AMP PROBE: CPT

## 2024-04-29 PROCEDURE — 87633 RESP VIRUS 12-25 TARGETS: CPT

## 2024-04-29 PROCEDURE — 84439 ASSAY OF FREE THYROXINE: CPT

## 2024-04-29 PROCEDURE — 80048 BASIC METABOLIC PNL TOTAL CA: CPT

## 2024-04-29 PROCEDURE — 85049 AUTOMATED PLATELET COUNT: CPT

## 2024-04-29 PROCEDURE — 86140 C-REACTIVE PROTEIN: CPT

## 2024-04-29 PROCEDURE — 87486 CHLMYD PNEUM DNA AMP PROBE: CPT

## 2024-04-29 PROCEDURE — 85025 COMPLETE CBC W/AUTO DIFF WBC: CPT

## 2024-04-29 PROCEDURE — 81001 URINALYSIS AUTO W/SCOPE: CPT

## 2024-04-29 PROCEDURE — 82550 ASSAY OF CK (CPK): CPT

## 2024-04-29 PROCEDURE — 93041 RHYTHM ECG TRACING: CPT

## 2024-04-29 PROCEDURE — 96361 HYDRATE IV INFUSION ADD-ON: CPT

## 2024-04-29 PROCEDURE — 83690 ASSAY OF LIPASE: CPT

## 2024-04-29 PROCEDURE — 82140 ASSAY OF AMMONIA: CPT

## 2024-04-29 PROCEDURE — 94760 N-INVAS EAR/PLS OXIMETRY 1: CPT

## 2024-04-29 PROCEDURE — 84145 PROCALCITONIN (PCT): CPT

## 2024-04-29 PROCEDURE — 83605 ASSAY OF LACTIC ACID: CPT

## 2024-04-29 PROCEDURE — 80076 HEPATIC FUNCTION PANEL: CPT

## 2024-04-29 PROCEDURE — 84443 ASSAY THYROID STIM HORMONE: CPT

## 2024-04-29 RX ADMIN — SODIUM CHLORIDE ONE MLS/HR: 9 INJECTION, SOLUTION INTRAVENOUS at 09:47

## 2024-04-29 RX ADMIN — SODIUM CHLORIDE ONE MLS/HR: 9 INJECTION, SOLUTION INTRAVENOUS at 08:51

## 2024-04-29 RX ADMIN — ONDANSETRON ONE MG: 2 INJECTION INTRAMUSCULAR; INTRAVENOUS at 09:49

## 2024-05-08 ENCOUNTER — HOSPITAL ENCOUNTER (OUTPATIENT)
Dept: HOSPITAL 53 - M LAB | Age: 64
End: 2024-05-08
Attending: INTERNAL MEDICINE
Payer: COMMERCIAL

## 2024-05-08 DIAGNOSIS — C22.0: Primary | ICD-10-CM

## 2024-05-08 DIAGNOSIS — Z76.82: ICD-10-CM

## 2024-05-08 LAB
ALBUMIN SERPL BCG-MCNC: 3.2 G/DL (ref 3.2–5.2)
ALP SERPL-CCNC: 257 U/L (ref 46–116)
ALT SERPL W P-5'-P-CCNC: 51 U/L (ref 7–40)
AST SERPL-CCNC: 113 U/L (ref ?–34)
BASOPHILS # BLD AUTO: 0 10^3/UL (ref 0–0.2)
BASOPHILS NFR BLD AUTO: 0.9 % (ref 0–1)
BILIRUB SERPL-MCNC: 2.4 MG/DL (ref 0.3–1.2)
BUN SERPL-MCNC: 18 MG/DL (ref 9–23)
CALCIUM SERPL-MCNC: 8.7 MG/DL (ref 8.3–10.6)
CHLORIDE SERPL-SCNC: 108 MMOL/L (ref 98–107)
CO2 SERPL-SCNC: 25 MMOL/L (ref 20–31)
CREAT SERPL-MCNC: 1.44 MG/DL (ref 0.7–1.3)
EOSINOPHIL # BLD AUTO: 0.1 10^3/UL (ref 0–0.5)
EOSINOPHIL NFR BLD AUTO: 3.1 % (ref 0–3)
GFR SERPL CREATININE-BSD FRML MDRD: 52.6 ML/MIN/{1.73_M2} (ref 49–?)
GLUCOSE SERPL-MCNC: 115 MG/DL (ref 74–106)
HCT VFR BLD AUTO: 28.6 % (ref 42–52)
HGB BLD-MCNC: 9.3 G/DL (ref 13.5–17.5)
INR PPP: 1.41
LYMPHOCYTES # BLD AUTO: 0.8 10^3/UL (ref 1.5–5)
LYMPHOCYTES NFR BLD AUTO: 17.4 % (ref 24–44)
MCH RBC QN AUTO: 32.2 PG (ref 27–33)
MCHC RBC AUTO-ENTMCNC: 32.5 G/DL (ref 32–36.5)
MCV RBC AUTO: 99 FL (ref 80–96)
MONOCYTES # BLD AUTO: 0.7 10^3/UL (ref 0–0.8)
MONOCYTES NFR BLD AUTO: 16.1 % (ref 2–8)
NEUTROPHILS # BLD AUTO: 2.8 10^3/UL (ref 1.5–8.5)
NEUTROPHILS NFR BLD AUTO: 62.1 % (ref 36–66)
PLATELET # BLD AUTO: 117 10^3/UL (ref 150–450)
POTASSIUM SERPL-SCNC: 4.5 MMOL/L (ref 3.5–5.1)
PROT SERPL-MCNC: 6.3 G/DL (ref 5.7–8.2)
PROTHROMBIN TIME: 16.8 SECONDS (ref 12.5–14.5)
RBC # BLD AUTO: 2.89 10^6/UL (ref 4.3–6.1)
SODIUM SERPL-SCNC: 138 MMOL/L (ref 136–145)
WBC # BLD AUTO: 4.5 10^3/UL (ref 4–10)

## 2024-05-15 ENCOUNTER — HOSPITAL ENCOUNTER (OUTPATIENT)
Dept: HOSPITAL 53 - M LAB | Age: 64
End: 2024-05-15
Attending: THORACIC SURGERY (CARDIOTHORACIC VASCULAR SURGERY)
Payer: COMMERCIAL

## 2024-05-15 DIAGNOSIS — Z76.82: Primary | ICD-10-CM

## 2024-05-15 LAB
ALBUMIN SERPL BCG-MCNC: 3.1 G/DL (ref 3.2–5.2)
ALP SERPL-CCNC: 285 U/L (ref 46–116)
ALT SERPL W P-5'-P-CCNC: 53 U/L (ref 7–40)
AST SERPL-CCNC: 109 U/L (ref ?–34)
BASOPHILS # BLD AUTO: 0.1 10^3/UL (ref 0–0.2)
BASOPHILS NFR BLD AUTO: 1.1 % (ref 0–1)
BILIRUB SERPL-MCNC: 2.3 MG/DL (ref 0.3–1.2)
BUN SERPL-MCNC: 16 MG/DL (ref 9–23)
CALCIUM SERPL-MCNC: 8.4 MG/DL (ref 8.3–10.6)
CHLORIDE SERPL-SCNC: 108 MMOL/L (ref 98–107)
CO2 SERPL-SCNC: 26 MMOL/L (ref 20–31)
CREAT SERPL-MCNC: 1.21 MG/DL (ref 0.7–1.3)
EOSINOPHIL # BLD AUTO: 0.1 10^3/UL (ref 0–0.5)
EOSINOPHIL NFR BLD AUTO: 2.4 % (ref 0–3)
GFR SERPL CREATININE-BSD FRML MDRD: > 60 ML/MIN/{1.73_M2} (ref 49–?)
GLUCOSE SERPL-MCNC: 108 MG/DL (ref 74–106)
HCT VFR BLD AUTO: 28.6 % (ref 42–52)
HGB BLD-MCNC: 9.2 G/DL (ref 13.5–17.5)
INR PPP: 1.59
LYMPHOCYTES # BLD AUTO: 0.7 10^3/UL (ref 1.5–5)
LYMPHOCYTES NFR BLD AUTO: 15.9 % (ref 24–44)
MCH RBC QN AUTO: 31.5 PG (ref 27–33)
MCHC RBC AUTO-ENTMCNC: 32.2 G/DL (ref 32–36.5)
MCV RBC AUTO: 97.9 FL (ref 80–96)
MONOCYTES # BLD AUTO: 0.6 10^3/UL (ref 0–0.8)
MONOCYTES NFR BLD AUTO: 14 % (ref 2–8)
NEUTROPHILS # BLD AUTO: 3 10^3/UL (ref 1.5–8.5)
NEUTROPHILS NFR BLD AUTO: 66.2 % (ref 36–66)
PLATELET # BLD AUTO: 114 10^3/UL (ref 150–450)
POTASSIUM SERPL-SCNC: 4.2 MMOL/L (ref 3.5–5.1)
PROT SERPL-MCNC: 6.1 G/DL (ref 5.7–8.2)
PROTHROMBIN TIME: 18.4 SECONDS (ref 12.5–14.5)
RBC # BLD AUTO: 2.92 10^6/UL (ref 4.3–6.1)
SODIUM SERPL-SCNC: 139 MMOL/L (ref 136–145)
WBC # BLD AUTO: 4.6 10^3/UL (ref 4–10)

## 2024-05-22 ENCOUNTER — HOSPITAL ENCOUNTER (OUTPATIENT)
Dept: HOSPITAL 53 - M LAB | Age: 64
End: 2024-05-22
Attending: THORACIC SURGERY (CARDIOTHORACIC VASCULAR SURGERY)
Payer: COMMERCIAL

## 2024-05-22 DIAGNOSIS — Z76.82: ICD-10-CM

## 2024-05-22 DIAGNOSIS — C22.0: Primary | ICD-10-CM

## 2024-05-22 LAB
ALBUMIN SERPL BCG-MCNC: 3 G/DL (ref 3.2–5.2)
ALP SERPL-CCNC: 258 U/L (ref 46–116)
ALT SERPL W P-5'-P-CCNC: 41 U/L (ref 7–40)
AST SERPL-CCNC: 80 U/L (ref ?–34)
BASOPHILS # BLD AUTO: 0 10^3/UL (ref 0–0.2)
BASOPHILS NFR BLD AUTO: 0.8 % (ref 0–1)
BILIRUB SERPL-MCNC: 2.5 MG/DL (ref 0.3–1.2)
BUN SERPL-MCNC: 22 MG/DL (ref 9–23)
CALCIUM SERPL-MCNC: 8.5 MG/DL (ref 8.3–10.6)
CHLORIDE SERPL-SCNC: 108 MMOL/L (ref 98–107)
CO2 SERPL-SCNC: 26 MMOL/L (ref 20–31)
CREAT SERPL-MCNC: 1.31 MG/DL (ref 0.7–1.3)
EOSINOPHIL # BLD AUTO: 0.2 10^3/UL (ref 0–0.5)
EOSINOPHIL NFR BLD AUTO: 3.1 % (ref 0–3)
GFR SERPL CREATININE-BSD FRML MDRD: 58.6 ML/MIN/{1.73_M2} (ref 49–?)
GLUCOSE SERPL-MCNC: 113 MG/DL (ref 74–106)
HCT VFR BLD AUTO: 30.1 % (ref 42–52)
HGB BLD-MCNC: 9.7 G/DL (ref 13.5–17.5)
INR PPP: 1.47
LYMPHOCYTES # BLD AUTO: 0.9 10^3/UL (ref 1.5–5)
LYMPHOCYTES NFR BLD AUTO: 16.9 % (ref 24–44)
MCH RBC QN AUTO: 31.1 PG (ref 27–33)
MCHC RBC AUTO-ENTMCNC: 32.2 G/DL (ref 32–36.5)
MCV RBC AUTO: 96.5 FL (ref 80–96)
MONOCYTES # BLD AUTO: 0.7 10^3/UL (ref 0–0.8)
MONOCYTES NFR BLD AUTO: 12.6 % (ref 2–8)
NEUTROPHILS # BLD AUTO: 3.4 10^3/UL (ref 1.5–8.5)
NEUTROPHILS NFR BLD AUTO: 66.2 % (ref 36–66)
PLATELET # BLD AUTO: 104 10^3/UL (ref 150–450)
POTASSIUM SERPL-SCNC: 4.4 MMOL/L (ref 3.5–5.1)
PROT SERPL-MCNC: 6.4 G/DL (ref 5.7–8.2)
PROTHROMBIN TIME: 17.3 SECONDS (ref 12.5–14.5)
RBC # BLD AUTO: 3.12 10^6/UL (ref 4.3–6.1)
SODIUM SERPL-SCNC: 141 MMOL/L (ref 136–145)
WBC # BLD AUTO: 5.1 10^3/UL (ref 4–10)

## 2024-05-29 ENCOUNTER — HOSPITAL ENCOUNTER (OUTPATIENT)
Dept: HOSPITAL 53 - M LAB | Age: 64
End: 2024-05-29
Attending: INTERNAL MEDICINE
Payer: COMMERCIAL

## 2024-05-29 DIAGNOSIS — C22.0: Primary | ICD-10-CM

## 2024-05-29 DIAGNOSIS — Z76.82: ICD-10-CM

## 2024-05-29 LAB
ALBUMIN SERPL BCG-MCNC: 2.8 G/DL (ref 3.2–5.2)
ALP SERPL-CCNC: 261 U/L (ref 46–116)
ALT SERPL W P-5'-P-CCNC: 42 U/L (ref 7–40)
AST SERPL-CCNC: 82 U/L (ref ?–34)
BASOPHILS # BLD AUTO: 0.1 10^3/UL (ref 0–0.2)
BASOPHILS NFR BLD AUTO: 1 % (ref 0–1)
BILIRUB SERPL-MCNC: 1.8 MG/DL (ref 0.3–1.2)
BUN SERPL-MCNC: 21 MG/DL (ref 9–23)
CALCIUM SERPL-MCNC: 8.4 MG/DL (ref 8.3–10.6)
CHLORIDE SERPL-SCNC: 103 MMOL/L (ref 98–107)
CO2 SERPL-SCNC: 28 MMOL/L (ref 20–31)
CREAT SERPL-MCNC: 1.46 MG/DL (ref 0.7–1.3)
EOSINOPHIL # BLD AUTO: 0.2 10^3/UL (ref 0–0.5)
EOSINOPHIL NFR BLD AUTO: 3.2 % (ref 0–3)
GFR SERPL CREATININE-BSD FRML MDRD: 51.7 ML/MIN/{1.73_M2} (ref 49–?)
GLUCOSE SERPL-MCNC: 170 MG/DL (ref 74–106)
HCT VFR BLD AUTO: 31.2 % (ref 42–52)
HGB BLD-MCNC: 10.3 G/DL (ref 13.5–17.5)
INR PPP: 1.37
LYMPHOCYTES # BLD AUTO: 0.9 10^3/UL (ref 1.5–5)
LYMPHOCYTES NFR BLD AUTO: 13.8 % (ref 24–44)
MCH RBC QN AUTO: 31.6 PG (ref 27–33)
MCHC RBC AUTO-ENTMCNC: 33 G/DL (ref 32–36.5)
MCV RBC AUTO: 95.7 FL (ref 80–96)
MONOCYTES # BLD AUTO: 0.8 10^3/UL (ref 0–0.8)
MONOCYTES NFR BLD AUTO: 12.7 % (ref 2–8)
NEUTROPHILS # BLD AUTO: 4.3 10^3/UL (ref 1.5–8.5)
NEUTROPHILS NFR BLD AUTO: 69.1 % (ref 36–66)
PLATELET # BLD AUTO: 130 10^3/UL (ref 150–450)
POTASSIUM SERPL-SCNC: 4.3 MMOL/L (ref 3.5–5.1)
PROT SERPL-MCNC: 6.6 G/DL (ref 5.7–8.2)
PROTHROMBIN TIME: 16.4 SECONDS (ref 12.5–14.5)
RBC # BLD AUTO: 3.26 10^6/UL (ref 4.3–6.1)
SODIUM SERPL-SCNC: 137 MMOL/L (ref 136–145)
WBC # BLD AUTO: 6.2 10^3/UL (ref 4–10)

## 2024-06-05 ENCOUNTER — HOSPITAL ENCOUNTER (OUTPATIENT)
Dept: HOSPITAL 53 - M LAB | Age: 64
End: 2024-06-05
Attending: INTERNAL MEDICINE
Payer: COMMERCIAL

## 2024-06-05 DIAGNOSIS — Z76.82: ICD-10-CM

## 2024-06-05 DIAGNOSIS — C22.0: Primary | ICD-10-CM

## 2024-06-05 LAB
ALBUMIN SERPL BCG-MCNC: 2.9 G/DL (ref 3.2–5.2)
ALP SERPL-CCNC: 237 U/L (ref 46–116)
ALT SERPL W P-5'-P-CCNC: 35 U/L (ref 7–40)
AST SERPL-CCNC: 68 U/L (ref ?–34)
BASOPHILS # BLD AUTO: 0 10^3/UL (ref 0–0.2)
BASOPHILS NFR BLD AUTO: 0.8 % (ref 0–1)
BILIRUB SERPL-MCNC: 2.1 MG/DL (ref 0.3–1.2)
BUN SERPL-MCNC: 26 MG/DL (ref 9–23)
CALCIUM SERPL-MCNC: 8.9 MG/DL (ref 8.3–10.6)
CHLORIDE SERPL-SCNC: 104 MMOL/L (ref 98–107)
CO2 SERPL-SCNC: 28 MMOL/L (ref 20–31)
CREAT SERPL-MCNC: 1.5 MG/DL (ref 0.7–1.3)
EOSINOPHIL # BLD AUTO: 0.1 10^3/UL (ref 0–0.5)
EOSINOPHIL NFR BLD AUTO: 2.5 % (ref 0–3)
GFR SERPL CREATININE-BSD FRML MDRD: 50.2 ML/MIN/{1.73_M2} (ref 49–?)
GLUCOSE SERPL-MCNC: 164 MG/DL (ref 74–106)
HCT VFR BLD AUTO: 32.4 % (ref 42–52)
HGB BLD-MCNC: 10.1 G/DL (ref 13.5–17.5)
INR PPP: 1.39
LYMPHOCYTES # BLD AUTO: 0.8 10^3/UL (ref 1.5–5)
LYMPHOCYTES NFR BLD AUTO: 16.1 % (ref 24–44)
MCH RBC QN AUTO: 29.7 PG (ref 27–33)
MCHC RBC AUTO-ENTMCNC: 31.2 G/DL (ref 32–36.5)
MCV RBC AUTO: 95.3 FL (ref 80–96)
MONOCYTES # BLD AUTO: 0.8 10^3/UL (ref 0–0.8)
MONOCYTES NFR BLD AUTO: 14.9 % (ref 2–8)
NEUTROPHILS # BLD AUTO: 3.4 10^3/UL (ref 1.5–8.5)
NEUTROPHILS NFR BLD AUTO: 65.5 % (ref 36–66)
PLATELET # BLD AUTO: 119 10^3/UL (ref 150–450)
POTASSIUM SERPL-SCNC: 4.5 MMOL/L (ref 3.5–5.1)
PROT SERPL-MCNC: 6.8 G/DL (ref 5.7–8.2)
PROTHROMBIN TIME: 16.6 SECONDS (ref 12.5–14.5)
RBC # BLD AUTO: 3.4 10^6/UL (ref 4.3–6.1)
SODIUM SERPL-SCNC: 137 MMOL/L (ref 136–145)
WBC # BLD AUTO: 5.2 10^3/UL (ref 4–10)

## 2024-10-10 ENCOUNTER — HOSPITAL ENCOUNTER (OUTPATIENT)
Dept: HOSPITAL 53 - M LAB REF | Age: 64
End: 2024-10-10
Attending: INTERNAL MEDICINE
Payer: COMMERCIAL

## 2024-10-10 DIAGNOSIS — D50.9: Primary | ICD-10-CM

## 2024-10-10 LAB
IRON SATN MFR SERPL: 9.7 % (ref 19.7–50)
IRON SERPL-MCNC: 35 UG/DL (ref 65–175)
TIBC SERPL-MCNC: 360 UG/DL (ref 250–425)

## 2025-01-20 ENCOUNTER — HOSPITAL ENCOUNTER (OUTPATIENT)
Dept: HOSPITAL 53 - M LAB REF | Age: 65
End: 2025-01-20
Attending: INTERNAL MEDICINE
Payer: COMMERCIAL

## 2025-01-20 DIAGNOSIS — N18.6: Primary | ICD-10-CM

## 2025-01-20 LAB
HBV CORE IGM SER QL: NEGATIVE
HBV SURFACE AB SER QL: POSITIVE
HBV SURFACE AB SER-ACNC: NEGATIVE M[IU]/ML
HCV AB SER QL: 0.02 INDEX (ref ?–0.8)

## 2025-04-02 ENCOUNTER — HOSPITAL ENCOUNTER (OUTPATIENT)
Dept: HOSPITAL 53 - M LAB REF | Age: 65
End: 2025-04-02
Attending: FAMILY MEDICINE
Payer: COMMERCIAL

## 2025-04-02 DIAGNOSIS — R19.7: Primary | ICD-10-CM

## 2025-04-04 ENCOUNTER — HOSPITAL ENCOUNTER (EMERGENCY)
Dept: HOSPITAL 53 - M ED | Age: 65
Discharge: HOME | End: 2025-04-04
Payer: MEDICARE

## 2025-04-04 VITALS — WEIGHT: 233.69 LBS | BODY MASS INDEX: 32.72 KG/M2 | HEIGHT: 71 IN

## 2025-04-04 VITALS — TEMPERATURE: 96.6 F

## 2025-04-04 VITALS — DIASTOLIC BLOOD PRESSURE: 79 MMHG | OXYGEN SATURATION: 98 % | SYSTOLIC BLOOD PRESSURE: 155 MMHG

## 2025-04-04 DIAGNOSIS — N17.9: Primary | ICD-10-CM

## 2025-04-04 DIAGNOSIS — Z79.83: ICD-10-CM

## 2025-04-04 DIAGNOSIS — K21.9: ICD-10-CM

## 2025-04-04 DIAGNOSIS — Z79.899: ICD-10-CM

## 2025-04-04 DIAGNOSIS — K76.0: ICD-10-CM

## 2025-04-04 DIAGNOSIS — Z79.82: ICD-10-CM

## 2025-04-04 DIAGNOSIS — Z79.02: ICD-10-CM

## 2025-04-04 DIAGNOSIS — I10: ICD-10-CM

## 2025-04-04 DIAGNOSIS — Z86.79: ICD-10-CM

## 2025-04-04 DIAGNOSIS — Z94.0: ICD-10-CM

## 2025-04-04 LAB
ALBUMIN SERPL BCG-MCNC: 3.6 G/DL (ref 3.2–5.2)
BASOPHILS NFR BLD AUTO: 0.3 % (ref 0–1)
BILIRUB CONJ SERPL-MCNC: 0.4 MG/DL (ref ?–0.4)
BILIRUB SERPL-MCNC: 1.4 MG/DL (ref 0.3–1.2)
CALCIUM SERPL-MCNC: 8.8 MG/DL (ref 8.3–10.6)
CREAT SERPL-MCNC: 2.46 MG/DL (ref 0.7–1.3)
EOSINOPHIL # BLD AUTO: 0.1 10^3/UL (ref 0–0.5)
EOSINOPHIL NFR BLD AUTO: 0.9 % (ref 0–3)
GFR SERPL CREATININE-BSD FRML MDRD: 28.4 ML/MIN/{1.73_M2} (ref 49–?)
HCT VFR BLD AUTO: 33.9 % (ref 42–52)
HGB BLD-MCNC: 11.3 G/DL (ref 13.5–17.5)
KETONES UR STRIP.AUTO-MCNC: NEGATIVE MG/DL
LEUKOCYTE ESTERASE UR QL STRIP.AUTO: NEGATIVE
LYMPHOCYTES # BLD AUTO: 0.8 10^3/UL (ref 1.5–5)
LYMPHOCYTES NFR BLD AUTO: 9.6 % (ref 24–44)
MCH RBC QN AUTO: 28.1 PG (ref 27–33)
MCHC RBC AUTO-ENTMCNC: 33.3 G/DL (ref 32–36.5)
MCV RBC AUTO: 84.3 FL (ref 80–96)
MONOCYTES # BLD AUTO: 0.4 10^3/UL (ref 0–0.8)
MONOCYTES NFR BLD AUTO: 4.1 % (ref 2–8)
NEUTROPHILS # BLD AUTO: 7.3 10^3/UL (ref 1.5–8.5)
NITRITE UR QL STRIP.AUTO: NEGATIVE
PLATELET # BLD AUTO: 132 10^3/UL (ref 150–450)
POTASSIUM SERPL-SCNC: 5.7 MMOL/L (ref 3.5–5.1)
PROT SERPL-MCNC: 6.4 G/DL (ref 5.7–8.2)
RBC # BLD AUTO: 4.02 10^6/UL (ref 4.3–6.1)
RBC # UR AUTO: 0 /HPF (ref 0–3)
SQUAMOUS UR QL AUTO: 0 /HPF (ref 0–6)
WBC # BLD AUTO: 8.7 10^3/UL (ref 4–10)
WBC UR QL AUTO: 0 /HPF (ref 0–3)